# Patient Record
Sex: FEMALE | Race: BLACK OR AFRICAN AMERICAN | NOT HISPANIC OR LATINO | URBAN - METROPOLITAN AREA
[De-identification: names, ages, dates, MRNs, and addresses within clinical notes are randomized per-mention and may not be internally consistent; named-entity substitution may affect disease eponyms.]

---

## 2017-01-09 ENCOUNTER — OUTPATIENT (OUTPATIENT)
Dept: OUTPATIENT SERVICES | Facility: HOSPITAL | Age: 47
LOS: 1 days | End: 2017-01-09
Payer: COMMERCIAL

## 2017-01-09 VITALS
RESPIRATION RATE: 15 BRPM | HEIGHT: 63 IN | WEIGHT: 136.03 LBS | SYSTOLIC BLOOD PRESSURE: 140 MMHG | DIASTOLIC BLOOD PRESSURE: 94 MMHG | HEART RATE: 88 BPM | OXYGEN SATURATION: 99 % | TEMPERATURE: 98 F

## 2017-01-09 DIAGNOSIS — Z98.890 OTHER SPECIFIED POSTPROCEDURAL STATES: Chronic | ICD-10-CM

## 2017-01-09 DIAGNOSIS — R03.0 ELEVATED BLOOD-PRESSURE READING, WITHOUT DIAGNOSIS OF HYPERTENSION: ICD-10-CM

## 2017-01-09 DIAGNOSIS — D25.9 LEIOMYOMA OF UTERUS, UNSPECIFIED: ICD-10-CM

## 2017-01-09 DIAGNOSIS — R05 COUGH: ICD-10-CM

## 2017-01-09 LAB
BASOPHILS # BLD AUTO: 0.02 K/UL — SIGNIFICANT CHANGE UP (ref 0–0.2)
BASOPHILS NFR BLD AUTO: 0.3 % — SIGNIFICANT CHANGE UP (ref 0–2)
BLD GP AB SCN SERPL QL: NEGATIVE — SIGNIFICANT CHANGE UP
BUN SERPL-MCNC: 15 MG/DL — SIGNIFICANT CHANGE UP (ref 7–23)
CALCIUM SERPL-MCNC: 10.2 MG/DL — SIGNIFICANT CHANGE UP (ref 8.4–10.5)
CHLORIDE SERPL-SCNC: 95 MMOL/L — LOW (ref 98–107)
CO2 SERPL-SCNC: 29 MMOL/L — SIGNIFICANT CHANGE UP (ref 22–31)
CREAT SERPL-MCNC: 0.99 MG/DL — SIGNIFICANT CHANGE UP (ref 0.5–1.3)
EOSINOPHIL # BLD AUTO: 0.06 K/UL — SIGNIFICANT CHANGE UP (ref 0–0.5)
EOSINOPHIL NFR BLD AUTO: 1 % — SIGNIFICANT CHANGE UP (ref 0–6)
GLUCOSE SERPL-MCNC: 86 MG/DL — SIGNIFICANT CHANGE UP (ref 70–99)
HCG SERPL-ACNC: < 5 MIU/ML — SIGNIFICANT CHANGE UP
HCT VFR BLD CALC: 41.1 % — SIGNIFICANT CHANGE UP (ref 34.5–45)
HGB BLD-MCNC: 13.1 G/DL — SIGNIFICANT CHANGE UP (ref 11.5–15.5)
IMM GRANULOCYTES NFR BLD AUTO: 0.3 % — SIGNIFICANT CHANGE UP (ref 0–1.5)
LYMPHOCYTES # BLD AUTO: 1.36 K/UL — SIGNIFICANT CHANGE UP (ref 1–3.3)
LYMPHOCYTES # BLD AUTO: 21.6 % — SIGNIFICANT CHANGE UP (ref 13–44)
MCHC RBC-ENTMCNC: 28.9 PG — SIGNIFICANT CHANGE UP (ref 27–34)
MCHC RBC-ENTMCNC: 31.9 % — LOW (ref 32–36)
MCV RBC AUTO: 90.5 FL — SIGNIFICANT CHANGE UP (ref 80–100)
MONOCYTES # BLD AUTO: 0.22 K/UL — SIGNIFICANT CHANGE UP (ref 0–0.9)
MONOCYTES NFR BLD AUTO: 3.5 % — SIGNIFICANT CHANGE UP (ref 2–14)
NEUTROPHILS # BLD AUTO: 4.63 K/UL — SIGNIFICANT CHANGE UP (ref 1.8–7.4)
NEUTROPHILS NFR BLD AUTO: 73.3 % — SIGNIFICANT CHANGE UP (ref 43–77)
PLATELET # BLD AUTO: 337 K/UL — SIGNIFICANT CHANGE UP (ref 150–400)
PMV BLD: 11.4 FL — SIGNIFICANT CHANGE UP (ref 7–13)
POTASSIUM SERPL-MCNC: 5.1 MMOL/L — SIGNIFICANT CHANGE UP (ref 3.5–5.3)
POTASSIUM SERPL-SCNC: 5.1 MMOL/L — SIGNIFICANT CHANGE UP (ref 3.5–5.3)
RBC # BLD: 4.54 M/UL — SIGNIFICANT CHANGE UP (ref 3.8–5.2)
RBC # FLD: 13.2 % — SIGNIFICANT CHANGE UP (ref 10.3–14.5)
RH IG SCN BLD-IMP: POSITIVE — SIGNIFICANT CHANGE UP
SODIUM SERPL-SCNC: 139 MMOL/L — SIGNIFICANT CHANGE UP (ref 135–145)
WBC # BLD: 6.31 K/UL — SIGNIFICANT CHANGE UP (ref 3.8–10.5)
WBC # FLD AUTO: 6.31 K/UL — SIGNIFICANT CHANGE UP (ref 3.8–10.5)

## 2017-01-09 PROCEDURE — 93010 ELECTROCARDIOGRAM REPORT: CPT

## 2017-01-09 RX ORDER — SODIUM CHLORIDE 9 MG/ML
1000 INJECTION, SOLUTION INTRAVENOUS
Qty: 0 | Refills: 0 | Status: DISCONTINUED | OUTPATIENT
Start: 2017-01-19 | End: 2017-01-20

## 2017-01-09 NOTE — H&P PST ADULT - FAMILY HISTORY
Mother  Still living? Yes, Estimated age: Age Unknown  Family history of diabetes mellitus, Age at diagnosis: Age Unknown

## 2017-01-09 NOTE — H&P PST ADULT - PSH
History of dilation and curettage  2015 2016  History of endometrial ablation  2016  History of ovarian cystectomy  2014

## 2017-01-09 NOTE — H&P PST ADULT - PROBLEM SELECTOR PLAN 2
I called PMD office  and informed of /94 at PST.  pt has appt 1/10/17 for eval and clearance, requested on chart.

## 2017-01-09 NOTE — H&P PST ADULT - ASSESSMENT
47 y/o female with hx of heavy menstruation and bleeding in between periods x 2 years. Dx with Leiomyoma of uterus.  Pt reports she has been receiving Lupron injections with some improvement.   Now scheduled for Supracervical hysterectomy with removal of uterus and tubes 1/19/17.

## 2017-01-09 NOTE — H&P PST ADULT - ENMT COMMENTS
s/p URI with sore throat Dec 25,2016.  Completed Tx with Azithromycin and Benzonatate.  Pt reports symptoms resolved, however she still has a hoarse voice. s/p URI with sore throat Dec 25,2016.  Completed Tx with Azithromycin and Benzonatate.  Pt reports she hoarse voice and intermittent dry cough persist. Denies dyspnea, no CP

## 2017-01-09 NOTE — H&P PST ADULT - HISTORY OF PRESENT ILLNESS
47 y/o female with hx of heavy menstruation and bleeding in between periods x 2 years.  Pt reports she has been receiving Lupron injections  with some improvement.  Now scheduled for Supracervical hysterectomy with removal of uterus and tubes 1/19/17. 45 y/o female with hx of heavy menstruation and bleeding in between periods x 2 years. Dx with Leiomyoma of uterus.  Pt reports she has been receiving Lupron injections with some improvement.   Now scheduled for Supracervical hysterectomy with removal of uterus and tubes 1/19/17.

## 2017-01-09 NOTE — H&P PST ADULT - PROBLEM SELECTOR PLAN 3
Pt with hx of URI 12/26/16 , competed course of Z Massimo.  Cough persisting.  Pt has appt with PMD 1/10/17 for re-eval and clearance. Dr. Michi Boykin office informed

## 2017-01-09 NOTE — H&P PST ADULT - GENITOURINARY COMMENTS
47 y/o female with hx of heavy menstruation and bleeding in between periods x 2 years.  Pt reports she has been receiving Lupron injections  with some improvement.  Now scheduled for Supracervical hysterectomy with removal of uterus and tubes 1/19/17.

## 2017-01-17 PROBLEM — Z00.00 ENCOUNTER FOR PREVENTIVE HEALTH EXAMINATION: Status: ACTIVE | Noted: 2017-01-17

## 2017-01-18 ENCOUNTER — RESULT REVIEW (OUTPATIENT)
Age: 47
End: 2017-01-18

## 2017-01-19 ENCOUNTER — INPATIENT (INPATIENT)
Facility: HOSPITAL | Age: 47
LOS: 1 days | Discharge: HOME | End: 2017-01-21
Attending: OBSTETRICS & GYNECOLOGY | Admitting: OBSTETRICS & GYNECOLOGY
Payer: COMMERCIAL

## 2017-01-19 VITALS — WEIGHT: 136.03 LBS | HEIGHT: 63 IN

## 2017-01-19 DIAGNOSIS — Z98.890 OTHER SPECIFIED POSTPROCEDURAL STATES: Chronic | ICD-10-CM

## 2017-01-19 DIAGNOSIS — D25.9 LEIOMYOMA OF UTERUS, UNSPECIFIED: ICD-10-CM

## 2017-01-19 DIAGNOSIS — N92.0 EXCESSIVE AND FREQUENT MENSTRUATION WITH REGULAR CYCLE: ICD-10-CM

## 2017-01-19 LAB
BUN SERPL-MCNC: 7 MG/DL — SIGNIFICANT CHANGE UP (ref 7–23)
CALCIUM SERPL-MCNC: 8.8 MG/DL — SIGNIFICANT CHANGE UP (ref 8.4–10.5)
CHLORIDE SERPL-SCNC: 100 MMOL/L — SIGNIFICANT CHANGE UP (ref 98–107)
CO2 SERPL-SCNC: 24 MMOL/L — SIGNIFICANT CHANGE UP (ref 22–31)
CREAT SERPL-MCNC: 1.01 MG/DL — SIGNIFICANT CHANGE UP (ref 0.5–1.3)
GLUCOSE SERPL-MCNC: 140 MG/DL — HIGH (ref 70–99)
HCG UR QL: NEGATIVE — SIGNIFICANT CHANGE UP
HCT VFR BLD CALC: 34.1 % — LOW (ref 34.5–45)
HGB BLD-MCNC: 11.2 G/DL — LOW (ref 11.5–15.5)
MCHC RBC-ENTMCNC: 29.2 PG — SIGNIFICANT CHANGE UP (ref 27–34)
MCHC RBC-ENTMCNC: 32.8 % — SIGNIFICANT CHANGE UP (ref 32–36)
MCV RBC AUTO: 89 FL — SIGNIFICANT CHANGE UP (ref 80–100)
PLATELET # BLD AUTO: 208 K/UL — SIGNIFICANT CHANGE UP (ref 150–400)
PMV BLD: 11.9 FL — SIGNIFICANT CHANGE UP (ref 7–13)
POTASSIUM SERPL-MCNC: 4.1 MMOL/L — SIGNIFICANT CHANGE UP (ref 3.5–5.3)
POTASSIUM SERPL-SCNC: 4.1 MMOL/L — SIGNIFICANT CHANGE UP (ref 3.5–5.3)
RBC # BLD: 3.83 M/UL — SIGNIFICANT CHANGE UP (ref 3.8–5.2)
RBC # FLD: 13 % — SIGNIFICANT CHANGE UP (ref 10.3–14.5)
RH IG SCN BLD-IMP: POSITIVE — SIGNIFICANT CHANGE UP
SODIUM SERPL-SCNC: 140 MMOL/L — SIGNIFICANT CHANGE UP (ref 135–145)
WBC # BLD: 14.38 K/UL — HIGH (ref 3.8–10.5)
WBC # FLD AUTO: 14.38 K/UL — HIGH (ref 3.8–10.5)

## 2017-01-19 PROCEDURE — 88307 TISSUE EXAM BY PATHOLOGIST: CPT | Mod: 26

## 2017-01-19 PROCEDURE — 88305 TISSUE EXAM BY PATHOLOGIST: CPT | Mod: 26

## 2017-01-19 RX ORDER — DIPHENHYDRAMINE HCL 50 MG
12.5 CAPSULE ORAL EVERY 4 HOURS
Qty: 0 | Refills: 0 | Status: DISCONTINUED | OUTPATIENT
Start: 2017-01-19 | End: 2017-01-20

## 2017-01-19 RX ORDER — ONDANSETRON 8 MG/1
4 TABLET, FILM COATED ORAL ONCE
Qty: 0 | Refills: 0 | Status: DISCONTINUED | OUTPATIENT
Start: 2017-01-19 | End: 2017-01-19

## 2017-01-19 RX ORDER — HYDROMORPHONE HYDROCHLORIDE 2 MG/ML
30 INJECTION INTRAMUSCULAR; INTRAVENOUS; SUBCUTANEOUS
Qty: 0 | Refills: 0 | Status: DISCONTINUED | OUTPATIENT
Start: 2017-01-19 | End: 2017-01-20

## 2017-01-19 RX ORDER — ONDANSETRON 8 MG/1
4 TABLET, FILM COATED ORAL EVERY 6 HOURS
Qty: 0 | Refills: 0 | Status: DISCONTINUED | OUTPATIENT
Start: 2017-01-19 | End: 2017-01-20

## 2017-01-19 RX ORDER — SIMETHICONE 80 MG/1
80 TABLET, CHEWABLE ORAL EVERY 6 HOURS
Qty: 0 | Refills: 0 | Status: DISCONTINUED | OUTPATIENT
Start: 2017-01-19 | End: 2017-01-21

## 2017-01-19 RX ORDER — DEXAMETHASONE 0.5 MG/5ML
4 ELIXIR ORAL EVERY 6 HOURS
Qty: 0 | Refills: 0 | Status: DISCONTINUED | OUTPATIENT
Start: 2017-01-19 | End: 2017-01-20

## 2017-01-19 RX ORDER — HYDROMORPHONE HYDROCHLORIDE 2 MG/ML
0.5 INJECTION INTRAMUSCULAR; INTRAVENOUS; SUBCUTANEOUS
Qty: 0 | Refills: 0 | Status: DISCONTINUED | OUTPATIENT
Start: 2017-01-19 | End: 2017-01-19

## 2017-01-19 RX ORDER — HYDROMORPHONE HYDROCHLORIDE 2 MG/ML
0.5 INJECTION INTRAMUSCULAR; INTRAVENOUS; SUBCUTANEOUS
Qty: 0 | Refills: 0 | Status: DISCONTINUED | OUTPATIENT
Start: 2017-01-19 | End: 2017-01-20

## 2017-01-19 RX ORDER — HEPARIN SODIUM 5000 [USP'U]/ML
5000 INJECTION INTRAVENOUS; SUBCUTANEOUS EVERY 12 HOURS
Qty: 0 | Refills: 0 | Status: DISCONTINUED | OUTPATIENT
Start: 2017-01-19 | End: 2017-01-21

## 2017-01-19 RX ORDER — FENTANYL CITRATE 50 UG/ML
50 INJECTION INTRAVENOUS
Qty: 0 | Refills: 0 | Status: DISCONTINUED | OUTPATIENT
Start: 2017-01-19 | End: 2017-01-19

## 2017-01-19 RX ORDER — DOCUSATE SODIUM 100 MG
100 CAPSULE ORAL THREE TIMES A DAY
Qty: 0 | Refills: 0 | Status: DISCONTINUED | OUTPATIENT
Start: 2017-01-19 | End: 2017-01-21

## 2017-01-19 RX ORDER — BUTORPHANOL TARTRATE 2 MG/ML
0.12 INJECTION, SOLUTION INTRAMUSCULAR; INTRAVENOUS EVERY 6 HOURS
Qty: 0 | Refills: 0 | Status: DISCONTINUED | OUTPATIENT
Start: 2017-01-19 | End: 2017-01-20

## 2017-01-19 RX ORDER — KETOROLAC TROMETHAMINE 30 MG/ML
30 SYRINGE (ML) INJECTION EVERY 6 HOURS
Qty: 0 | Refills: 0 | Status: DISCONTINUED | OUTPATIENT
Start: 2017-01-19 | End: 2017-01-20

## 2017-01-19 RX ORDER — NALOXONE HYDROCHLORIDE 4 MG/.1ML
0.1 SPRAY NASAL
Qty: 0 | Refills: 0 | Status: DISCONTINUED | OUTPATIENT
Start: 2017-01-19 | End: 2017-01-20

## 2017-01-19 RX ORDER — SODIUM CHLORIDE 9 MG/ML
1000 INJECTION, SOLUTION INTRAVENOUS
Qty: 0 | Refills: 0 | Status: DISCONTINUED | OUTPATIENT
Start: 2017-01-19 | End: 2017-01-20

## 2017-01-19 RX ADMIN — HYDROMORPHONE HYDROCHLORIDE 30 MILLILITER(S): 2 INJECTION INTRAMUSCULAR; INTRAVENOUS; SUBCUTANEOUS at 17:39

## 2017-01-19 RX ADMIN — SODIUM CHLORIDE 30 MILLILITER(S): 9 INJECTION, SOLUTION INTRAVENOUS at 17:13

## 2017-01-19 RX ADMIN — SODIUM CHLORIDE 125 MILLILITER(S): 9 INJECTION, SOLUTION INTRAVENOUS at 23:09

## 2017-01-19 RX ADMIN — HYDROMORPHONE HYDROCHLORIDE 0.5 MILLIGRAM(S): 2 INJECTION INTRAMUSCULAR; INTRAVENOUS; SUBCUTANEOUS at 18:47

## 2017-01-19 RX ADMIN — Medication 100 MILLIGRAM(S): at 22:14

## 2017-01-19 RX ADMIN — Medication 30 MILLIGRAM(S): at 19:00

## 2017-01-19 RX ADMIN — HYDROMORPHONE HYDROCHLORIDE 0.5 MILLIGRAM(S): 2 INJECTION INTRAMUSCULAR; INTRAVENOUS; SUBCUTANEOUS at 17:27

## 2017-01-19 RX ADMIN — Medication 30 MILLIGRAM(S): at 18:58

## 2017-01-19 RX ADMIN — HYDROMORPHONE HYDROCHLORIDE 0.5 MILLIGRAM(S): 2 INJECTION INTRAMUSCULAR; INTRAVENOUS; SUBCUTANEOUS at 17:45

## 2017-01-19 RX ADMIN — HEPARIN SODIUM 5000 UNIT(S): 5000 INJECTION INTRAVENOUS; SUBCUTANEOUS at 22:14

## 2017-01-19 RX ADMIN — SODIUM CHLORIDE 125 MILLILITER(S): 9 INJECTION, SOLUTION INTRAVENOUS at 17:27

## 2017-01-19 RX ADMIN — HYDROMORPHONE HYDROCHLORIDE 0.5 MILLIGRAM(S): 2 INJECTION INTRAMUSCULAR; INTRAVENOUS; SUBCUTANEOUS at 17:16

## 2017-01-19 RX ADMIN — HYDROMORPHONE HYDROCHLORIDE 30 MILLILITER(S): 2 INJECTION INTRAMUSCULAR; INTRAVENOUS; SUBCUTANEOUS at 23:09

## 2017-01-20 LAB
HCT VFR BLD CALC: 30.4 % — LOW (ref 34.5–45)
HGB BLD-MCNC: 10 G/DL — LOW (ref 11.5–15.5)
MCHC RBC-ENTMCNC: 29.5 PG — SIGNIFICANT CHANGE UP (ref 27–34)
MCHC RBC-ENTMCNC: 32.9 % — SIGNIFICANT CHANGE UP (ref 32–36)
MCV RBC AUTO: 89.7 FL — SIGNIFICANT CHANGE UP (ref 80–100)
PLATELET # BLD AUTO: 216 K/UL — SIGNIFICANT CHANGE UP (ref 150–400)
PMV BLD: 12.1 FL — SIGNIFICANT CHANGE UP (ref 7–13)
RBC # BLD: 3.39 M/UL — LOW (ref 3.8–5.2)
RBC # FLD: 13 % — SIGNIFICANT CHANGE UP (ref 10.3–14.5)
WBC # BLD: 10.84 K/UL — HIGH (ref 3.8–10.5)
WBC # FLD AUTO: 10.84 K/UL — HIGH (ref 3.8–10.5)

## 2017-01-20 RX ORDER — IBUPROFEN 200 MG
1 TABLET ORAL
Qty: 0 | Refills: 0 | COMMUNITY
Start: 2017-01-20

## 2017-01-20 RX ORDER — OXYCODONE HYDROCHLORIDE 5 MG/1
5 TABLET ORAL EVERY 4 HOURS
Qty: 0 | Refills: 0 | Status: DISCONTINUED | OUTPATIENT
Start: 2017-01-20 | End: 2017-01-21

## 2017-01-20 RX ORDER — ACETAMINOPHEN 500 MG
975 TABLET ORAL EVERY 6 HOURS
Qty: 0 | Refills: 0 | Status: DISCONTINUED | OUTPATIENT
Start: 2017-01-20 | End: 2017-01-21

## 2017-01-20 RX ORDER — OXYCODONE HYDROCHLORIDE 5 MG/1
1 TABLET ORAL
Qty: 20 | Refills: 0 | OUTPATIENT
Start: 2017-01-20

## 2017-01-20 RX ORDER — OXYCODONE HYDROCHLORIDE 5 MG/1
5 TABLET ORAL
Qty: 0 | Refills: 0 | Status: DISCONTINUED | OUTPATIENT
Start: 2017-01-20 | End: 2017-01-21

## 2017-01-20 RX ORDER — ACETAMINOPHEN 500 MG
3 TABLET ORAL
Qty: 0 | Refills: 0 | COMMUNITY
Start: 2017-01-20

## 2017-01-20 RX ORDER — IBUPROFEN 200 MG
600 TABLET ORAL EVERY 6 HOURS
Qty: 0 | Refills: 0 | Status: DISCONTINUED | OUTPATIENT
Start: 2017-01-20 | End: 2017-01-21

## 2017-01-20 RX ADMIN — Medication 100 MILLIGRAM(S): at 05:46

## 2017-01-20 RX ADMIN — Medication 600 MILLIGRAM(S): at 13:52

## 2017-01-20 RX ADMIN — Medication 100 MILLIGRAM(S): at 21:29

## 2017-01-20 RX ADMIN — Medication 975 MILLIGRAM(S): at 17:55

## 2017-01-20 RX ADMIN — Medication 100 MILLIGRAM(S): at 13:16

## 2017-01-20 RX ADMIN — Medication 30 MILLIGRAM(S): at 01:00

## 2017-01-20 RX ADMIN — OXYCODONE HYDROCHLORIDE 5 MILLIGRAM(S): 5 TABLET ORAL at 22:20

## 2017-01-20 RX ADMIN — OXYCODONE HYDROCHLORIDE 5 MILLIGRAM(S): 5 TABLET ORAL at 21:29

## 2017-01-20 RX ADMIN — Medication 975 MILLIGRAM(S): at 13:15

## 2017-01-20 RX ADMIN — Medication 975 MILLIGRAM(S): at 13:51

## 2017-01-20 RX ADMIN — Medication 30 MILLIGRAM(S): at 05:47

## 2017-01-20 RX ADMIN — OXYCODONE HYDROCHLORIDE 5 MILLIGRAM(S): 5 TABLET ORAL at 10:45

## 2017-01-20 RX ADMIN — Medication 600 MILLIGRAM(S): at 17:55

## 2017-01-20 RX ADMIN — Medication 600 MILLIGRAM(S): at 17:35

## 2017-01-20 RX ADMIN — Medication 975 MILLIGRAM(S): at 17:35

## 2017-01-20 RX ADMIN — SIMETHICONE 80 MILLIGRAM(S): 80 TABLET, CHEWABLE ORAL at 22:54

## 2017-01-20 RX ADMIN — Medication 600 MILLIGRAM(S): at 13:15

## 2017-01-20 RX ADMIN — HEPARIN SODIUM 5000 UNIT(S): 5000 INJECTION INTRAVENOUS; SUBCUTANEOUS at 10:45

## 2017-01-20 RX ADMIN — HEPARIN SODIUM 5000 UNIT(S): 5000 INJECTION INTRAVENOUS; SUBCUTANEOUS at 21:29

## 2017-01-20 RX ADMIN — OXYCODONE HYDROCHLORIDE 5 MILLIGRAM(S): 5 TABLET ORAL at 11:15

## 2017-01-20 RX ADMIN — Medication 30 MILLIGRAM(S): at 00:31

## 2017-01-20 RX ADMIN — Medication 30 MILLIGRAM(S): at 06:15

## 2017-01-20 NOTE — DISCHARGE NOTE ADULT - HOSPITAL COURSE
Patient had uncomplicated supracervical hysterectomy with bilateral salpingectomy.  Please see operative note for details.  During postoperative course patient's vitals were stable, vaginal bleeding appropriate, and pain well controlled.  Post operation day one hematocrit was 41.1->34.1->30.4.  On day of discharge patient was ambulating, her pain controlled with oral medications, had adequate oral intake, and was voiding freely.  Discharge instructions and precautions were given.  Will have close follow up with Dr. Haile.

## 2017-01-20 NOTE — DISCHARGE NOTE ADULT - CARE PLAN
Principal Discharge DX:	Uterine leiomyoma, unspecified location  Goal:	recovery  Instructions for follow-up, activity and diet:	Regular diet as tolerated, regular activity as tolerated, no heavy lifting for first two weeks.  Nothing per vagina: no intercourse, tampons or douching.  Call your provider if you experience fevers, chills, worsening abdominal pain, inability to urinate or worsening vaginal bleeding.  Follow up with your provider in 2 weeks.

## 2017-01-20 NOTE — DISCHARGE NOTE ADULT - INSTRUCTIONS
Call MD for fever greater than 101, nausea, vomiting, increased pain, pus drainage from incision, or go to ER.

## 2017-01-20 NOTE — DISCHARGE NOTE ADULT - MEDICATION SUMMARY - MEDICATIONS TO TAKE
I will START or STAY ON the medications listed below when I get home from the hospital:    acetaminophen 325 mg oral tablet  -- 3 tab(s) by mouth every 6 hours  -- Indication: For pain    ibuprofen 600 mg oral tablet  -- 1 tab(s) by mouth every 6 hours  -- Indication: For pain    oxyCODONE 5 mg oral tablet  -- 1 tab(s) by mouth every 6 hours, As Needed, Severe Pain (7 - 10) MDD:4 tabs  -- Indication: For pain

## 2017-01-20 NOTE — DISCHARGE NOTE ADULT - PLAN OF CARE
recovery Regular diet as tolerated, regular activity as tolerated, no heavy lifting for first two weeks.  Nothing per vagina: no intercourse, tampons or douching.  Call your provider if you experience fevers, chills, worsening abdominal pain, inability to urinate or worsening vaginal bleeding.  Follow up with your provider in 2 weeks.

## 2017-01-20 NOTE — DISCHARGE NOTE ADULT - CARE PROVIDER_API CALL
Demetra Haile (MD), Obstetrics and Gynecology  [address]  [place] [id number]  Phone: [phone]  Fax: [phone]

## 2017-01-20 NOTE — DISCHARGE NOTE ADULT - PATIENT PORTAL LINK FT
“You can access the Follow[employer] Patient Portal, offered by [facility], by registering with the following website: [email or web address]”

## 2017-01-21 VITALS
OXYGEN SATURATION: 100 % | HEART RATE: 84 BPM | RESPIRATION RATE: 18 BRPM | SYSTOLIC BLOOD PRESSURE: 140 MMHG | TEMPERATURE: 98 F | DIASTOLIC BLOOD PRESSURE: 79 MMHG

## 2017-01-21 RX ADMIN — OXYCODONE HYDROCHLORIDE 5 MILLIGRAM(S): 5 TABLET ORAL at 02:04

## 2017-01-21 RX ADMIN — Medication 975 MILLIGRAM(S): at 12:00

## 2017-01-21 RX ADMIN — Medication 600 MILLIGRAM(S): at 05:19

## 2017-01-21 RX ADMIN — Medication 100 MILLIGRAM(S): at 05:19

## 2017-01-21 RX ADMIN — Medication 975 MILLIGRAM(S): at 06:10

## 2017-01-21 RX ADMIN — Medication 975 MILLIGRAM(S): at 01:20

## 2017-01-21 RX ADMIN — Medication 600 MILLIGRAM(S): at 06:10

## 2017-01-21 RX ADMIN — Medication 600 MILLIGRAM(S): at 12:00

## 2017-01-21 RX ADMIN — SIMETHICONE 80 MILLIGRAM(S): 80 TABLET, CHEWABLE ORAL at 05:19

## 2017-01-21 RX ADMIN — HEPARIN SODIUM 5000 UNIT(S): 5000 INJECTION INTRAVENOUS; SUBCUTANEOUS at 10:02

## 2017-01-21 RX ADMIN — Medication 600 MILLIGRAM(S): at 00:28

## 2017-01-21 RX ADMIN — Medication 975 MILLIGRAM(S): at 05:19

## 2017-01-21 RX ADMIN — Medication 600 MILLIGRAM(S): at 01:20

## 2017-01-21 RX ADMIN — OXYCODONE HYDROCHLORIDE 5 MILLIGRAM(S): 5 TABLET ORAL at 02:34

## 2017-01-21 RX ADMIN — Medication 975 MILLIGRAM(S): at 00:28

## 2017-01-21 RX ADMIN — Medication 975 MILLIGRAM(S): at 11:29

## 2017-01-21 RX ADMIN — Medication 600 MILLIGRAM(S): at 11:29

## 2017-02-18 ENCOUNTER — INPATIENT (INPATIENT)
Facility: HOSPITAL | Age: 47
LOS: 2 days | Discharge: HOME | End: 2017-02-21
Attending: OBSTETRICS & GYNECOLOGY | Admitting: OBSTETRICS & GYNECOLOGY
Payer: COMMERCIAL

## 2017-02-18 VITALS
RESPIRATION RATE: 22 BRPM | OXYGEN SATURATION: 100 % | SYSTOLIC BLOOD PRESSURE: 132 MMHG | HEART RATE: 88 BPM | DIASTOLIC BLOOD PRESSURE: 91 MMHG | TEMPERATURE: 98 F

## 2017-02-18 DIAGNOSIS — Z90.711 ACQUIRED ABSENCE OF UTERUS WITH REMAINING CERVICAL STUMP: Chronic | ICD-10-CM

## 2017-02-18 DIAGNOSIS — Z98.890 OTHER SPECIFIED POSTPROCEDURAL STATES: Chronic | ICD-10-CM

## 2017-02-18 DIAGNOSIS — R10.9 UNSPECIFIED ABDOMINAL PAIN: ICD-10-CM

## 2017-02-18 PROBLEM — D25.9 LEIOMYOMA OF UTERUS, UNSPECIFIED: Chronic | Status: ACTIVE | Noted: 2017-01-09

## 2017-02-18 LAB
ALBUMIN SERPL ELPH-MCNC: 4.1 G/DL — SIGNIFICANT CHANGE UP (ref 3.3–5)
ALP SERPL-CCNC: 46 U/L — SIGNIFICANT CHANGE UP (ref 40–120)
ALT FLD-CCNC: 14 U/L — SIGNIFICANT CHANGE UP (ref 4–33)
APTT BLD: 32.5 SEC — SIGNIFICANT CHANGE UP (ref 27.5–37.4)
AST SERPL-CCNC: 23 U/L — SIGNIFICANT CHANGE UP (ref 4–32)
BASE EXCESS BLDV CALC-SCNC: 6.9 MMOL/L — SIGNIFICANT CHANGE UP
BASOPHILS # BLD AUTO: 0.02 K/UL — SIGNIFICANT CHANGE UP (ref 0–0.2)
BASOPHILS NFR BLD AUTO: 0.7 % — SIGNIFICANT CHANGE UP (ref 0–2)
BASOPHILS NFR SPEC: 2 % — SIGNIFICANT CHANGE UP (ref 0–2)
BILIRUB SERPL-MCNC: 0.2 MG/DL — SIGNIFICANT CHANGE UP (ref 0.2–1.2)
BLD GP AB SCN SERPL QL: NEGATIVE — SIGNIFICANT CHANGE UP
BUN SERPL-MCNC: 13 MG/DL — SIGNIFICANT CHANGE UP (ref 7–23)
CALCIUM SERPL-MCNC: 9.6 MG/DL — SIGNIFICANT CHANGE UP (ref 8.4–10.5)
CHLORIDE BLDV-SCNC: 106 MMOL/L — SIGNIFICANT CHANGE UP (ref 96–108)
CHLORIDE SERPL-SCNC: 102 MMOL/L — SIGNIFICANT CHANGE UP (ref 98–107)
CO2 SERPL-SCNC: 29 MMOL/L — SIGNIFICANT CHANGE UP (ref 22–31)
CREAT SERPL-MCNC: 0.96 MG/DL — SIGNIFICANT CHANGE UP (ref 0.5–1.3)
EOSINOPHIL # BLD AUTO: 0.06 K/UL — SIGNIFICANT CHANGE UP (ref 0–0.5)
EOSINOPHIL NFR BLD AUTO: 2 % — SIGNIFICANT CHANGE UP (ref 0–6)
EOSINOPHIL NFR FLD: 2 % — SIGNIFICANT CHANGE UP (ref 0–6)
GAS PNL BLDV: 143 MMOL/L — SIGNIFICANT CHANGE UP (ref 136–146)
GLUCOSE BLDV-MCNC: 83 — SIGNIFICANT CHANGE UP (ref 70–99)
GLUCOSE SERPL-MCNC: 82 MG/DL — SIGNIFICANT CHANGE UP (ref 70–99)
HCG SERPL-ACNC: < 5 MIU/ML — SIGNIFICANT CHANGE UP
HCO3 BLDV-SCNC: 29 MMOL/L — HIGH (ref 20–27)
HCT VFR BLD CALC: 36.4 % — SIGNIFICANT CHANGE UP (ref 34.5–45)
HCT VFR BLDV CALC: 37 % — SIGNIFICANT CHANGE UP (ref 34.5–45)
HGB BLD-MCNC: 11.6 G/DL — SIGNIFICANT CHANGE UP (ref 11.5–15.5)
HGB BLDV-MCNC: 12 G/DL — SIGNIFICANT CHANGE UP (ref 11.5–15.5)
IMM GRANULOCYTES NFR BLD AUTO: 0 % — SIGNIFICANT CHANGE UP (ref 0–1.5)
INR BLD: 1.1 — SIGNIFICANT CHANGE UP (ref 0.87–1.18)
LACTATE BLDV-MCNC: 1.9 MMOL/L — SIGNIFICANT CHANGE UP (ref 0.5–2)
LYMPHOCYTES # BLD AUTO: 1.57 K/UL — SIGNIFICANT CHANGE UP (ref 1–3.3)
LYMPHOCYTES # BLD AUTO: 52.7 % — HIGH (ref 13–44)
LYMPHOCYTES NFR SPEC AUTO: 35 % — SIGNIFICANT CHANGE UP (ref 13–44)
MCHC RBC-ENTMCNC: 27.9 PG — SIGNIFICANT CHANGE UP (ref 27–34)
MCHC RBC-ENTMCNC: 31.9 % — LOW (ref 32–36)
MCV RBC AUTO: 87.5 FL — SIGNIFICANT CHANGE UP (ref 80–100)
METAMYELOCYTES # FLD: 1 % — SIGNIFICANT CHANGE UP (ref 0–1)
MONOCYTES # BLD AUTO: 0.16 K/UL — SIGNIFICANT CHANGE UP (ref 0–0.9)
MONOCYTES NFR BLD AUTO: 5.4 % — SIGNIFICANT CHANGE UP (ref 2–14)
MONOCYTES NFR BLD: 7 % — SIGNIFICANT CHANGE UP (ref 2–9)
NEUTROPHIL AB SER-ACNC: 41 % — LOW (ref 43–77)
NEUTROPHILS # BLD AUTO: 1.17 K/UL — LOW (ref 1.8–7.4)
NEUTROPHILS NFR BLD AUTO: 39.2 % — LOW (ref 43–77)
PCO2 BLDV: 51 MMHG — SIGNIFICANT CHANGE UP (ref 41–51)
PH BLDV: 7.41 PH — SIGNIFICANT CHANGE UP (ref 7.32–7.43)
PLATELET # BLD AUTO: 234 K/UL — SIGNIFICANT CHANGE UP (ref 150–400)
PMV BLD: 10.7 FL — SIGNIFICANT CHANGE UP (ref 7–13)
PO2 BLDV: 35 MMHG — SIGNIFICANT CHANGE UP (ref 35–40)
POTASSIUM BLDV-SCNC: 4 MMOL/L — SIGNIFICANT CHANGE UP (ref 3.4–4.5)
POTASSIUM SERPL-MCNC: 4.3 MMOL/L — SIGNIFICANT CHANGE UP (ref 3.5–5.3)
POTASSIUM SERPL-SCNC: 4.3 MMOL/L — SIGNIFICANT CHANGE UP (ref 3.5–5.3)
PROT SERPL-MCNC: 6.8 G/DL — SIGNIFICANT CHANGE UP (ref 6–8.3)
PROTHROM AB SERPL-ACNC: 12.6 SEC — SIGNIFICANT CHANGE UP (ref 10–13.1)
RBC # BLD: 4.16 M/UL — SIGNIFICANT CHANGE UP (ref 3.8–5.2)
RBC # FLD: 13.5 % — SIGNIFICANT CHANGE UP (ref 10.3–14.5)
RH IG SCN BLD-IMP: POSITIVE — SIGNIFICANT CHANGE UP
SAO2 % BLDV: 56.6 % — LOW (ref 60–85)
SODIUM SERPL-SCNC: 146 MMOL/L — HIGH (ref 135–145)
VARIANT LYMPHS # BLD: 12 % — SIGNIFICANT CHANGE UP
WBC # BLD: 2.98 K/UL — LOW (ref 3.8–10.5)
WBC # FLD AUTO: 2.98 K/UL — LOW (ref 3.8–10.5)

## 2017-02-18 PROCEDURE — 74177 CT ABD & PELVIS W/CONTRAST: CPT | Mod: 26

## 2017-02-18 RX ORDER — PIPERACILLIN AND TAZOBACTAM 4; .5 G/20ML; G/20ML
3.38 INJECTION, POWDER, LYOPHILIZED, FOR SOLUTION INTRAVENOUS EVERY 8 HOURS
Qty: 0 | Refills: 0 | Status: DISCONTINUED | OUTPATIENT
Start: 2017-02-18 | End: 2017-02-21

## 2017-02-18 RX ORDER — IBUPROFEN 200 MG
400 TABLET ORAL EVERY 6 HOURS
Qty: 0 | Refills: 0 | Status: DISCONTINUED | OUTPATIENT
Start: 2017-02-18 | End: 2017-02-21

## 2017-02-18 RX ORDER — VANCOMYCIN HCL 1 G
1000 VIAL (EA) INTRAVENOUS EVERY 12 HOURS
Qty: 0 | Refills: 0 | Status: DISCONTINUED | OUTPATIENT
Start: 2017-02-19 | End: 2017-02-21

## 2017-02-18 RX ORDER — DOCUSATE SODIUM 100 MG
100 CAPSULE ORAL THREE TIMES A DAY
Qty: 0 | Refills: 0 | Status: DISCONTINUED | OUTPATIENT
Start: 2017-02-18 | End: 2017-02-21

## 2017-02-18 RX ORDER — PIPERACILLIN AND TAZOBACTAM 4; .5 G/20ML; G/20ML
3.38 INJECTION, POWDER, LYOPHILIZED, FOR SOLUTION INTRAVENOUS ONCE
Qty: 0 | Refills: 0 | Status: COMPLETED | OUTPATIENT
Start: 2017-02-18 | End: 2017-02-18

## 2017-02-18 RX ORDER — SODIUM CHLORIDE 9 MG/ML
1000 INJECTION, SOLUTION INTRAVENOUS
Qty: 0 | Refills: 0 | Status: DISCONTINUED | OUTPATIENT
Start: 2017-02-18 | End: 2017-02-19

## 2017-02-18 RX ORDER — HEPARIN SODIUM 5000 [USP'U]/ML
5000 INJECTION INTRAVENOUS; SUBCUTANEOUS EVERY 12 HOURS
Qty: 0 | Refills: 0 | Status: DISCONTINUED | OUTPATIENT
Start: 2017-02-18 | End: 2017-02-19

## 2017-02-18 RX ORDER — SIMETHICONE 80 MG/1
80 TABLET, CHEWABLE ORAL THREE TIMES A DAY
Qty: 0 | Refills: 0 | Status: DISCONTINUED | OUTPATIENT
Start: 2017-02-18 | End: 2017-02-21

## 2017-02-18 RX ORDER — VANCOMYCIN HCL 1 G
1000 VIAL (EA) INTRAVENOUS ONCE
Qty: 0 | Refills: 0 | Status: COMPLETED | OUTPATIENT
Start: 2017-02-18 | End: 2017-02-18

## 2017-02-18 RX ORDER — ACETAMINOPHEN 500 MG
650 TABLET ORAL ONCE
Qty: 0 | Refills: 0 | Status: COMPLETED | OUTPATIENT
Start: 2017-02-18 | End: 2017-02-18

## 2017-02-18 RX ADMIN — Medication 250 MILLIGRAM(S): at 16:17

## 2017-02-18 RX ADMIN — PIPERACILLIN AND TAZOBACTAM 200 GRAM(S): 4; .5 INJECTION, POWDER, LYOPHILIZED, FOR SOLUTION INTRAVENOUS at 15:29

## 2017-02-18 RX ADMIN — PIPERACILLIN AND TAZOBACTAM 25 GRAM(S): 4; .5 INJECTION, POWDER, LYOPHILIZED, FOR SOLUTION INTRAVENOUS at 22:49

## 2017-02-18 RX ADMIN — Medication 650 MILLIGRAM(S): at 16:17

## 2017-02-18 RX ADMIN — HEPARIN SODIUM 5000 UNIT(S): 5000 INJECTION INTRAVENOUS; SUBCUTANEOUS at 22:04

## 2017-02-18 RX ADMIN — SODIUM CHLORIDE 30 MILLILITER(S): 9 INJECTION, SOLUTION INTRAVENOUS at 17:03

## 2017-02-18 NOTE — ED ADULT TRIAGE NOTE - CHIEF COMPLAINT QUOTE
pt sent by GYN for a r/o abscess, pt states she had a hysterectomy and salpingectomy on 01/19/17, she went for a follow up , also because she has been experiencing  Abdominal pain and Dysuria. pt states she has an in office ultrasound and the doctor found a possible Abscess.   pmhx Fibroids

## 2017-02-18 NOTE — ED PROVIDER NOTE - ATTENDING CONTRIBUTION TO CARE
I performed a face to face evaluation of this patient and performed a history and physical examination on the patient.  I agree with the resident's history, physical examination, and plan of the patient. patient complaining of lower abd pain. sent in from GYN office for possible fluid in abd on US. patient complaining of abd pain since surgery, not improving. denies fever. tender in suprapubic and llq. will perform CT abd/pelvis.

## 2017-02-18 NOTE — H&P ADULT. - PSH
H/O abdominal supracervical subtotal hysterectomy  b/l salpingectomy  History of dilation and curettage  2015 2016  History of endometrial ablation  2016  History of ovarian cystectomy  2014

## 2017-02-18 NOTE — H&P ADULT. - ASSESSMENT
45yo G0 with pelvic abscess in the postoperative state status post HIMA, BS on 1/19/17.    1. Admit to GYN  2. LR@125   3. Zosyn, Vancomycin, Van troughs q4 doses  4. Regular diet  5. Motrin, Tylenol  6. Daily AM CBC/BMP    Brittney, R2 d/w Dr. Kowalski and Maurice R4

## 2017-02-18 NOTE — H&P ADULT. - HISTORY OF PRESENT ILLNESS
45yo G0 p/w CC pelvic abcesses on imaging from her doctors and constant dull abdominal pain since her HIMA, BS on 1/19/17. Patient has had loose stools for 2 days. She denies fevers, chills, nausea, vomiting, cp and sob. Denies dysuria, hematuria frequency. Denies VB and abnormal vaginal discharge.

## 2017-02-18 NOTE — ED PROVIDER NOTE - MEDICAL DECISION MAKING DETAILS
45 yo F with possible post-surgical abscess - CT abdomen and pelvis, labs, urine - will touch base with GYN once labs CT return

## 2017-02-18 NOTE — ED PROVIDER NOTE - OBJECTIVE STATEMENT
47 yo F with history of uterine fibroids s/p hysterectomy on 1/19 with Dr. Haile presenting with persistent pain since the surgery. Pt went to office today 47 yo F with history of uterine fibroids s/p hysterectomy on 1/19 with Dr. Haile presenting with persistent pain since the surgery. Pt went to office today and had US and there was concern for fluid collection with concern for abscess. Pt denies any vaginal bleeding or superficial irritation or collection. Denies fevers, chills, cough, rhinorrhea, otorrhea, otalgia, nausea, vomiting, constipation, diarrhea, chest pain, shortness of breath or changes in urinary habits.

## 2017-02-18 NOTE — ED PROVIDER NOTE - NSTIMEPROVIDERCAREINITIATE_GEN_ER
How Severe Is Your Cyst?: moderate
18-Feb-2017 11:12
Is This A New Presentation, Or A Follow-Up?: Cyst
Additional History: Patient has previously had lesion treated with I&D at an urgent care, but she thinks it may be recurring.

## 2017-02-18 NOTE — ED ADULT NURSE NOTE - OBJECTIVE STATEMENT
Patient received to room 25 awake, alert, oriented x4, able to make needs known verbally.  Patient complains of lower abdominal pain radiating to back and left hip.  Patient s/p hysterectomy one month ago and states she's been having the pain for the whole month now.  Vitals recorded, hemodynamically stable.  Patient ambulatory free from injury.  NO external sings of bleeding or injury.  Patient has increased pain with abdominal palpation.   at bedside for emotional support.  Labs obtained, peripheral IV started.  Call bell within reach, safety maintained, will continue to monitor.

## 2017-02-19 LAB
BASOPHILS # BLD AUTO: 0.01 K/UL — SIGNIFICANT CHANGE UP (ref 0–0.2)
BASOPHILS NFR BLD AUTO: 0.2 % — SIGNIFICANT CHANGE UP (ref 0–2)
EOSINOPHIL # BLD AUTO: 0.1 K/UL — SIGNIFICANT CHANGE UP (ref 0–0.5)
EOSINOPHIL NFR BLD AUTO: 1.7 % — SIGNIFICANT CHANGE UP (ref 0–6)
HCT VFR BLD CALC: 34.9 % — SIGNIFICANT CHANGE UP (ref 34.5–45)
HCT VFR BLD CALC: 34.9 % — SIGNIFICANT CHANGE UP (ref 34.5–45)
HGB BLD-MCNC: 11.3 G/DL — LOW (ref 11.5–15.5)
HGB BLD-MCNC: 11.3 G/DL — LOW (ref 11.5–15.5)
IMM GRANULOCYTES NFR BLD AUTO: 0.2 % — SIGNIFICANT CHANGE UP (ref 0–1.5)
LYMPHOCYTES # BLD AUTO: 1.47 K/UL — SIGNIFICANT CHANGE UP (ref 1–3.3)
LYMPHOCYTES # BLD AUTO: 25.7 % — SIGNIFICANT CHANGE UP (ref 13–44)
MCHC RBC-ENTMCNC: 28 PG — SIGNIFICANT CHANGE UP (ref 27–34)
MCHC RBC-ENTMCNC: 28 PG — SIGNIFICANT CHANGE UP (ref 27–34)
MCHC RBC-ENTMCNC: 32.4 % — SIGNIFICANT CHANGE UP (ref 32–36)
MCHC RBC-ENTMCNC: 32.4 % — SIGNIFICANT CHANGE UP (ref 32–36)
MCV RBC AUTO: 86.6 FL — SIGNIFICANT CHANGE UP (ref 80–100)
MCV RBC AUTO: 86.6 FL — SIGNIFICANT CHANGE UP (ref 80–100)
MONOCYTES # BLD AUTO: 0.32 K/UL — SIGNIFICANT CHANGE UP (ref 0–0.9)
MONOCYTES NFR BLD AUTO: 5.6 % — SIGNIFICANT CHANGE UP (ref 2–14)
NEUTROPHILS # BLD AUTO: 3.81 K/UL — SIGNIFICANT CHANGE UP (ref 1.8–7.4)
NEUTROPHILS NFR BLD AUTO: 66.6 % — SIGNIFICANT CHANGE UP (ref 43–77)
PLATELET # BLD AUTO: 211 K/UL — SIGNIFICANT CHANGE UP (ref 150–400)
PLATELET # BLD AUTO: 211 K/UL — SIGNIFICANT CHANGE UP (ref 150–400)
PMV BLD: 10.7 FL — SIGNIFICANT CHANGE UP (ref 7–13)
PMV BLD: 10.7 FL — SIGNIFICANT CHANGE UP (ref 7–13)
RBC # BLD: 4.03 M/UL — SIGNIFICANT CHANGE UP (ref 3.8–5.2)
RBC # BLD: 4.03 M/UL — SIGNIFICANT CHANGE UP (ref 3.8–5.2)
RBC # FLD: 13.3 % — SIGNIFICANT CHANGE UP (ref 10.3–14.5)
RBC # FLD: 13.3 % — SIGNIFICANT CHANGE UP (ref 10.3–14.5)
WBC # BLD: 5.72 K/UL — SIGNIFICANT CHANGE UP (ref 3.8–10.5)
WBC # BLD: 5.72 K/UL — SIGNIFICANT CHANGE UP (ref 3.8–10.5)
WBC # FLD AUTO: 5.72 K/UL — SIGNIFICANT CHANGE UP (ref 3.8–10.5)
WBC # FLD AUTO: 5.72 K/UL — SIGNIFICANT CHANGE UP (ref 3.8–10.5)

## 2017-02-19 RX ORDER — DIPHENHYDRAMINE HCL 50 MG
25 CAPSULE ORAL ONCE
Qty: 0 | Refills: 0 | Status: DISCONTINUED | OUTPATIENT
Start: 2017-02-19 | End: 2017-02-19

## 2017-02-19 RX ORDER — SODIUM CHLORIDE 9 MG/ML
1000 INJECTION, SOLUTION INTRAVENOUS
Qty: 0 | Refills: 0 | Status: DISCONTINUED | OUTPATIENT
Start: 2017-02-19 | End: 2017-02-20

## 2017-02-19 RX ORDER — DIPHENHYDRAMINE HCL 50 MG
25 CAPSULE ORAL ONCE
Qty: 0 | Refills: 0 | Status: COMPLETED | OUTPATIENT
Start: 2017-02-19 | End: 2017-02-19

## 2017-02-19 RX ADMIN — PIPERACILLIN AND TAZOBACTAM 25 GRAM(S): 4; .5 INJECTION, POWDER, LYOPHILIZED, FOR SOLUTION INTRAVENOUS at 14:52

## 2017-02-19 RX ADMIN — HEPARIN SODIUM 5000 UNIT(S): 5000 INJECTION INTRAVENOUS; SUBCUTANEOUS at 18:10

## 2017-02-19 RX ADMIN — HEPARIN SODIUM 5000 UNIT(S): 5000 INJECTION INTRAVENOUS; SUBCUTANEOUS at 05:35

## 2017-02-19 RX ADMIN — Medication 250 MILLIGRAM(S): at 18:10

## 2017-02-19 RX ADMIN — PIPERACILLIN AND TAZOBACTAM 25 GRAM(S): 4; .5 INJECTION, POWDER, LYOPHILIZED, FOR SOLUTION INTRAVENOUS at 07:13

## 2017-02-19 RX ADMIN — Medication 250 MILLIGRAM(S): at 05:35

## 2017-02-19 RX ADMIN — Medication 25 MILLIGRAM(S): at 10:50

## 2017-02-19 RX ADMIN — PIPERACILLIN AND TAZOBACTAM 25 GRAM(S): 4; .5 INJECTION, POWDER, LYOPHILIZED, FOR SOLUTION INTRAVENOUS at 21:33

## 2017-02-20 LAB
BASOPHILS # BLD AUTO: 0.01 K/UL — SIGNIFICANT CHANGE UP (ref 0–0.2)
BASOPHILS NFR BLD AUTO: 0.2 % — SIGNIFICANT CHANGE UP (ref 0–2)
BUN SERPL-MCNC: 9 MG/DL — SIGNIFICANT CHANGE UP (ref 7–23)
CALCIUM SERPL-MCNC: 8.6 MG/DL — SIGNIFICANT CHANGE UP (ref 8.4–10.5)
CHLORIDE SERPL-SCNC: 101 MMOL/L — SIGNIFICANT CHANGE UP (ref 98–107)
CO2 SERPL-SCNC: 26 MMOL/L — SIGNIFICANT CHANGE UP (ref 22–31)
CREAT SERPL-MCNC: 1.07 MG/DL — SIGNIFICANT CHANGE UP (ref 0.5–1.3)
EOSINOPHIL # BLD AUTO: 0.16 K/UL — SIGNIFICANT CHANGE UP (ref 0–0.5)
EOSINOPHIL NFR BLD AUTO: 4 % — SIGNIFICANT CHANGE UP (ref 0–6)
GLUCOSE SERPL-MCNC: 113 MG/DL — HIGH (ref 70–99)
HCT VFR BLD CALC: 35 % — SIGNIFICANT CHANGE UP (ref 34.5–45)
HGB BLD-MCNC: 11.3 G/DL — LOW (ref 11.5–15.5)
IMM GRANULOCYTES NFR BLD AUTO: 0 % — SIGNIFICANT CHANGE UP (ref 0–1.5)
LYMPHOCYTES # BLD AUTO: 1.34 K/UL — SIGNIFICANT CHANGE UP (ref 1–3.3)
LYMPHOCYTES # BLD AUTO: 33.1 % — SIGNIFICANT CHANGE UP (ref 13–44)
MAGNESIUM SERPL-MCNC: 2 MG/DL — SIGNIFICANT CHANGE UP (ref 1.6–2.6)
MCHC RBC-ENTMCNC: 28.8 PG — SIGNIFICANT CHANGE UP (ref 27–34)
MCHC RBC-ENTMCNC: 32.3 % — SIGNIFICANT CHANGE UP (ref 32–36)
MCV RBC AUTO: 89.1 FL — SIGNIFICANT CHANGE UP (ref 80–100)
MONOCYTES # BLD AUTO: 0.29 K/UL — SIGNIFICANT CHANGE UP (ref 0–0.9)
MONOCYTES NFR BLD AUTO: 7.2 % — SIGNIFICANT CHANGE UP (ref 2–14)
NEUTROPHILS # BLD AUTO: 2.25 K/UL — SIGNIFICANT CHANGE UP (ref 1.8–7.4)
NEUTROPHILS NFR BLD AUTO: 55.5 % — SIGNIFICANT CHANGE UP (ref 43–77)
PHOSPHATE SERPL-MCNC: 3.3 MG/DL — SIGNIFICANT CHANGE UP (ref 2.5–4.5)
PLATELET # BLD AUTO: 209 K/UL — SIGNIFICANT CHANGE UP (ref 150–400)
PMV BLD: 11.2 FL — SIGNIFICANT CHANGE UP (ref 7–13)
POTASSIUM SERPL-MCNC: 4.2 MMOL/L — SIGNIFICANT CHANGE UP (ref 3.5–5.3)
POTASSIUM SERPL-SCNC: 4.2 MMOL/L — SIGNIFICANT CHANGE UP (ref 3.5–5.3)
RBC # BLD: 3.93 M/UL — SIGNIFICANT CHANGE UP (ref 3.8–5.2)
RBC # FLD: 13.3 % — SIGNIFICANT CHANGE UP (ref 10.3–14.5)
SODIUM SERPL-SCNC: 139 MMOL/L — SIGNIFICANT CHANGE UP (ref 135–145)
VANCOMYCIN TROUGH SERPL-MCNC: 16.3 UG/ML — SIGNIFICANT CHANGE UP (ref 10–20)
WBC # BLD: 4.05 K/UL — SIGNIFICANT CHANGE UP (ref 3.8–10.5)
WBC # FLD AUTO: 4.05 K/UL — SIGNIFICANT CHANGE UP (ref 3.8–10.5)

## 2017-02-20 RX ORDER — SODIUM CHLORIDE 9 MG/ML
1000 INJECTION, SOLUTION INTRAVENOUS
Qty: 0 | Refills: 0 | Status: DISCONTINUED | OUTPATIENT
Start: 2017-02-20 | End: 2017-02-21

## 2017-02-20 RX ORDER — HEPARIN SODIUM 5000 [USP'U]/ML
5000 INJECTION INTRAVENOUS; SUBCUTANEOUS EVERY 12 HOURS
Qty: 0 | Refills: 0 | Status: DISCONTINUED | OUTPATIENT
Start: 2017-02-20 | End: 2017-02-20

## 2017-02-20 RX ORDER — METRONIDAZOLE 500 MG
500 TABLET ORAL EVERY 12 HOURS
Qty: 0 | Refills: 0 | Status: DISCONTINUED | OUTPATIENT
Start: 2017-02-20 | End: 2017-02-21

## 2017-02-20 RX ADMIN — Medication 250 MILLIGRAM(S): at 05:27

## 2017-02-20 RX ADMIN — SODIUM CHLORIDE 125 MILLILITER(S): 9 INJECTION, SOLUTION INTRAVENOUS at 09:05

## 2017-02-20 RX ADMIN — SODIUM CHLORIDE 125 MILLILITER(S): 9 INJECTION, SOLUTION INTRAVENOUS at 17:19

## 2017-02-20 RX ADMIN — HEPARIN SODIUM 5000 UNIT(S): 5000 INJECTION INTRAVENOUS; SUBCUTANEOUS at 17:19

## 2017-02-20 RX ADMIN — Medication 250 MILLIGRAM(S): at 18:22

## 2017-02-20 RX ADMIN — PIPERACILLIN AND TAZOBACTAM 25 GRAM(S): 4; .5 INJECTION, POWDER, LYOPHILIZED, FOR SOLUTION INTRAVENOUS at 13:24

## 2017-02-20 RX ADMIN — PIPERACILLIN AND TAZOBACTAM 25 GRAM(S): 4; .5 INJECTION, POWDER, LYOPHILIZED, FOR SOLUTION INTRAVENOUS at 22:42

## 2017-02-20 RX ADMIN — Medication 100 MILLIGRAM(S): at 17:19

## 2017-02-20 RX ADMIN — PIPERACILLIN AND TAZOBACTAM 25 GRAM(S): 4; .5 INJECTION, POWDER, LYOPHILIZED, FOR SOLUTION INTRAVENOUS at 05:27

## 2017-02-21 ENCOUNTER — TRANSCRIPTION ENCOUNTER (OUTPATIENT)
Age: 47
End: 2017-02-21

## 2017-02-21 VITALS
TEMPERATURE: 98 F | OXYGEN SATURATION: 100 % | HEART RATE: 87 BPM | DIASTOLIC BLOOD PRESSURE: 84 MMHG | RESPIRATION RATE: 18 BRPM | SYSTOLIC BLOOD PRESSURE: 133 MMHG

## 2017-02-21 LAB
APTT BLD: 30.1 SEC — SIGNIFICANT CHANGE UP (ref 27.5–37.4)
BASOPHILS # BLD AUTO: 0.01 K/UL — SIGNIFICANT CHANGE UP (ref 0–0.2)
BASOPHILS NFR BLD AUTO: 0.3 % — SIGNIFICANT CHANGE UP (ref 0–2)
BUN SERPL-MCNC: 7 MG/DL — SIGNIFICANT CHANGE UP (ref 7–23)
CALCIUM SERPL-MCNC: 8.5 MG/DL — SIGNIFICANT CHANGE UP (ref 8.4–10.5)
CHLORIDE SERPL-SCNC: 100 MMOL/L — SIGNIFICANT CHANGE UP (ref 98–107)
CO2 SERPL-SCNC: 27 MMOL/L — SIGNIFICANT CHANGE UP (ref 22–31)
CREAT SERPL-MCNC: 0.92 MG/DL — SIGNIFICANT CHANGE UP (ref 0.5–1.3)
EOSINOPHIL # BLD AUTO: 0.11 K/UL — SIGNIFICANT CHANGE UP (ref 0–0.5)
EOSINOPHIL NFR BLD AUTO: 3.1 % — SIGNIFICANT CHANGE UP (ref 0–6)
GLUCOSE SERPL-MCNC: 105 MG/DL — HIGH (ref 70–99)
HCT VFR BLD CALC: 32.8 % — LOW (ref 34.5–45)
HGB BLD-MCNC: 10.4 G/DL — LOW (ref 11.5–15.5)
IMM GRANULOCYTES NFR BLD AUTO: 0.3 % — SIGNIFICANT CHANGE UP (ref 0–1.5)
INR BLD: 0.94 — SIGNIFICANT CHANGE UP (ref 0.87–1.18)
LYMPHOCYTES # BLD AUTO: 1.36 K/UL — SIGNIFICANT CHANGE UP (ref 1–3.3)
LYMPHOCYTES # BLD AUTO: 38.1 % — SIGNIFICANT CHANGE UP (ref 13–44)
MAGNESIUM SERPL-MCNC: 2 MG/DL — SIGNIFICANT CHANGE UP (ref 1.6–2.6)
MCHC RBC-ENTMCNC: 27.9 PG — SIGNIFICANT CHANGE UP (ref 27–34)
MCHC RBC-ENTMCNC: 31.7 % — LOW (ref 32–36)
MCV RBC AUTO: 87.9 FL — SIGNIFICANT CHANGE UP (ref 80–100)
MONOCYTES # BLD AUTO: 0.32 K/UL — SIGNIFICANT CHANGE UP (ref 0–0.9)
MONOCYTES NFR BLD AUTO: 9 % — SIGNIFICANT CHANGE UP (ref 2–14)
NEUTROPHILS # BLD AUTO: 1.76 K/UL — LOW (ref 1.8–7.4)
NEUTROPHILS NFR BLD AUTO: 49.2 % — SIGNIFICANT CHANGE UP (ref 43–77)
PHOSPHATE SERPL-MCNC: 2.3 MG/DL — LOW (ref 2.5–4.5)
PLATELET # BLD AUTO: 191 K/UL — SIGNIFICANT CHANGE UP (ref 150–400)
PMV BLD: 11 FL — SIGNIFICANT CHANGE UP (ref 7–13)
POTASSIUM SERPL-MCNC: 3.9 MMOL/L — SIGNIFICANT CHANGE UP (ref 3.5–5.3)
POTASSIUM SERPL-SCNC: 3.9 MMOL/L — SIGNIFICANT CHANGE UP (ref 3.5–5.3)
PROTHROM AB SERPL-ACNC: 10.7 SEC — SIGNIFICANT CHANGE UP (ref 10–13.1)
RBC # BLD: 3.73 M/UL — LOW (ref 3.8–5.2)
RBC # FLD: 13.2 % — SIGNIFICANT CHANGE UP (ref 10.3–14.5)
SODIUM SERPL-SCNC: 139 MMOL/L — SIGNIFICANT CHANGE UP (ref 135–145)
WBC # BLD: 3.57 K/UL — LOW (ref 3.8–10.5)
WBC # FLD AUTO: 3.57 K/UL — LOW (ref 3.8–10.5)

## 2017-02-21 PROCEDURE — 76830 TRANSVAGINAL US NON-OB: CPT | Mod: 26

## 2017-02-21 RX ORDER — CEPHALEXIN 500 MG
1 CAPSULE ORAL
Qty: 40 | Refills: 0 | OUTPATIENT
Start: 2017-02-21 | End: 2017-03-03

## 2017-02-21 RX ORDER — METRONIDAZOLE 500 MG
500 TABLET ORAL EVERY 12 HOURS
Qty: 0 | Refills: 0 | Status: DISCONTINUED | OUTPATIENT
Start: 2017-02-21 | End: 2017-02-21

## 2017-02-21 RX ORDER — CEPHALEXIN 500 MG
500 CAPSULE ORAL
Qty: 0 | Refills: 0 | Status: DISCONTINUED | OUTPATIENT
Start: 2017-02-21 | End: 2017-02-21

## 2017-02-21 RX ORDER — FLUCONAZOLE 150 MG/1
1 TABLET ORAL
Qty: 1 | Refills: 0 | OUTPATIENT
Start: 2017-02-21

## 2017-02-21 RX ORDER — METRONIDAZOLE 500 MG
1 TABLET ORAL
Qty: 28 | Refills: 0 | OUTPATIENT
Start: 2017-02-21 | End: 2017-03-07

## 2017-02-21 RX ORDER — METRONIDAZOLE 500 MG
500 TABLET ORAL EVERY 8 HOURS
Qty: 0 | Refills: 0 | Status: DISCONTINUED | OUTPATIENT
Start: 2017-02-21 | End: 2017-02-21

## 2017-02-21 RX ADMIN — Medication 100 MILLIGRAM(S): at 06:04

## 2017-02-21 RX ADMIN — SODIUM CHLORIDE 125 MILLILITER(S): 9 INJECTION, SOLUTION INTRAVENOUS at 02:52

## 2017-02-21 RX ADMIN — PIPERACILLIN AND TAZOBACTAM 25 GRAM(S): 4; .5 INJECTION, POWDER, LYOPHILIZED, FOR SOLUTION INTRAVENOUS at 06:05

## 2017-02-21 RX ADMIN — SODIUM CHLORIDE 125 MILLILITER(S): 9 INJECTION, SOLUTION INTRAVENOUS at 06:13

## 2017-02-21 RX ADMIN — Medication 250 MILLIGRAM(S): at 06:12

## 2017-02-21 NOTE — DISCHARGE NOTE ADULT - PATIENT PORTAL LINK FT
“You can access the Follow[employer] Patient Portal, offered by [facility], by registering with the following website: [email or web address]”

## 2017-02-21 NOTE — DISCHARGE NOTE ADULT - PLAN OF CARE
return to normal activities Call your doctor to go to Emergency Department if you develop fevers, chills, Shortness of breath, chest pain, vaginal bleeding, uncontrolled pain. Follow-up w/ Dr. Borden in [facility] on Friday. Call to confirm your appointment. Take the diflucan only if you develop a yeast infection secondary to the other antibiotics that were ordered for me.

## 2017-02-21 NOTE — DISCHARGE NOTE ADULT - CARE PROVIDER_API CALL
Demetra Haile (MD), Obstetrics and Gynecology  [address]  [place] [id number]  Phone: [phone]  Fax: [phone]

## 2017-02-21 NOTE — DISCHARGE NOTE ADULT - MEDICATION SUMMARY - MEDICATIONS TO TAKE
I will START or STAY ON the medications listed below when I get home from the hospital:    Flagyl 500 mg oral tablet  -- 1 tab(s) by mouth 2 times a day MDD:2  -- Do not drink alcoholic beverages when taking this medication.  Finish all this medication unless otherwise directed by prescriber.  May discolor urine or feces.    -- Indication: For pelvic pain    Diflucan 150 mg oral tablet  -- 1 tab(s) by mouth once if you develop yeast infection  -- Do not take this drug if you are pregnant.  Finish all this medication unless otherwise directed by prescriber.    -- Indication: For pelvic pain    Keflex 500 mg oral capsule  -- 1 cap(s) by mouth 4 times a day  -- Finish all this medication unless otherwise directed by prescriber.    -- Indication: For pelvic pain

## 2017-02-21 NOTE — DISCHARGE NOTE ADULT - CARE PLAN
Principal Discharge DX:	Abdominal pain, unspecified location  Goal:	return to normal activities  Instructions for follow-up, activity and diet:	Call your doctor to go to Emergency Department if you develop fevers, chills, Shortness of breath, chest pain, vaginal bleeding, uncontrolled pain. Follow-up w/ Dr. Borden in [facility] on Friday. Call to confirm your appointment. Take the diflucan only if you develop a yeast infection secondary to the other antibiotics that were ordered for me. Principal Discharge DX:	Abdominal pain, unspecified location  Goal:	return to normal activities  Instructions for follow-up, activity and diet:	Call your doctor to go to Emergency Department if you develop fevers, chills, Shortness of breath, chest pain, vaginal bleeding, uncontrolled pain. Follow-up w/ Dr. Borden in [facility] on Friday. Call to confirm your appointment. Take the diflucan only if you develop a yeast infection secondary to the other antibiotics that were ordered for me. Principal Discharge DX:	Abdominal pain, unspecified location  Goal:	return to normal activities  Instructions for follow-up, activity and diet:	Call your doctor to go to Emergency Department if you develop fevers, chills, Shortness of breath, chest pain, vaginal bleeding, uncontrolled pain. Follow-up w/ Dr. Borden in [facility] on Friday. Call to confirm your appointment. Take the diflucan only if you develop a yeast infection secondary to the other antibiotics that were ordered for me. Principal Discharge DX:	Abdominal pain, unspecified location  Goal:	return to normal activities  Instructions for follow-up, activity and diet:	Call your doctor to go to Emergency Department if you develop fevers, chills, Shortness of breath, chest pain, vaginal bleeding, uncontrolled pain. Follow-up w/ Dr. Borden in [facility] on Friday. Call to confirm your appointment. Take the diflucan only if you develop a yeast infection secondary to the other antibiotics that were ordered for me. Principal Discharge DX:	Abdominal pain, unspecified location  Goal:	return to normal activities  Instructions for follow-up, activity and diet:	Call your doctor to go to Emergency Department if you develop fevers, chills, Shortness of breath, chest pain, vaginal bleeding, uncontrolled pain. Follow-up w/ Dr. Borden in [facility] on Friday. Call to confirm your appointment. Take the diflucan only if you develop a yeast infection secondary to the other antibiotics that were ordered for me.

## 2017-02-21 NOTE — DISCHARGE NOTE ADULT - HOSPITAL COURSE
45yo a/w pelvic pain s/p HIMA, BS for fibroids (1/19) likely 2/2 to hematoma. Patient was found to have collection in the rectovesicular space. Patient was on vancomycin, zosyn, flagyn during hospital stay as the original diagnosis was thought to be an abscess. Patient discharged on HD#4 with close follow-up with Dr. Haile. 47yo a/w pelvic pain on 2/18 s/p HIMA, BS for fibroids (1/19). Patient was found on CTAP to have a possible abscess in the rectovesicular space. Patient was started on IV vancomycin, zosyn, flagyl during hospital stay. TVUS on 2/21 demonstrated a collection that was more consistent with a hematoma. IR was consulted and it was determined that collection did not need to be drained given improving clinical status. Patient discharged on HD#4 with close follow-up with Dr. Haile.

## 2017-08-14 NOTE — ED ADULT TRIAGE NOTE - CCCP TRG CHIEF CMPLNT
[facility]  Preoperative Instructions        Surgery Date 8/21/2017          Time of Arrival 12:30 PM    1. On the day of your surgery, please report to the Surgical Services Registration Desk and sign in at your designated time. The Surgery Center is located to the right of the Emergency Room. 2. You must have someone with you to drive you home. You should not drive a car for 24 hours following surgery. Please make arrangements for a friend or family member to stay with you for the first 24 hours after your surgery. 3. Do not have anything to eat or drink (including water, gum, mints, coffee, juice) after midnight ?8/20/2017? Frutoso Golder ? This may not apply to medications prescribed by your physician. ?(Please note below the special instructions with medications to take the morning of your procedure.)    4. We recommend you do not drink any alcoholic beverages for 24 hours before and after your surgery. 5. Stop all Aspirin, non-steroidal anti-inflammatory drugs (i.e. Advil, Aleve), vitamins, and supplements?as directed by your surgeon's office. ? **If you are currently taking Plavix, Coumadin, or other blood-thinning agents, contact your surgeon for instructions. **    6. Wear comfortable clothes. Wear glasses instead of contacts. Do not bring any money or jewelry. Please bring picture ID, insurance card, and any prearranged co-payment or hospital payment. Do not wear make-up, particularly mascara the morning of your surgery. Do not wear nail polish, particularly if you are having foot /hand surgery. Wear your hair loose or down, no ponytails, buns, cris pins or clips. All body piercings must be removed. Please shower with antibacterial soap for three consecutive days before and on the morning of surgery, but do not apply any lotions, powders or deodorants after the shower on the day of surgery. Please use a fresh towels after each shower.  Please sleep in clean clothes and change bed linens the night before surgery. Please do not shave for 48 hours prior to surgery. Shaving of the face is acceptable. 7. You should understand that if you do not follow these instructions your surgery may be cancelled. If your physical condition changes (I.e. fever, cold or flu) please contact your surgeon as soon as possible. 8. It is important that you be on time. If a situation occurs where you may be late, please call [phone] (OR Holding Area). 9. If you have any questions and or problems, please call [phone] (Pre-admission Testing). 10. Your surgery time may be subject to change. You will receive a phone call the evening prior if your time changes. 11.  If having outpatient surgery, you must have someone to drive you here, stay with you during the duration of your stay, and to drive you home at time of discharge. Special Instructions:None  MEDICATIONS TO TAKE THE MORNING OF SURGERY WITH A SIP OF WATER:None      I understand a pre-operative phone call will be made to verify my surgery time. In the event that I am not available, I give permission for a message to be left on my answering service and/or with another person?   Yes [phone]         ___________________      __________   _________    (Signature of Patient)             (Witness)                (Date and Time) abscess

## 2017-09-12 ENCOUNTER — EMERGENCY (EMERGENCY)
Facility: HOSPITAL | Age: 47
LOS: 1 days | Discharge: HOME | End: 2017-09-12
Attending: EMERGENCY MEDICINE | Admitting: EMERGENCY MEDICINE
Payer: COMMERCIAL

## 2017-09-12 VITALS
OXYGEN SATURATION: 100 % | SYSTOLIC BLOOD PRESSURE: 154 MMHG | DIASTOLIC BLOOD PRESSURE: 92 MMHG | RESPIRATION RATE: 16 BRPM | HEART RATE: 105 BPM | TEMPERATURE: 98 F

## 2017-09-12 VITALS
SYSTOLIC BLOOD PRESSURE: 152 MMHG | DIASTOLIC BLOOD PRESSURE: 105 MMHG | RESPIRATION RATE: 18 BRPM | HEART RATE: 122 BPM | OXYGEN SATURATION: 100 % | TEMPERATURE: 98 F

## 2017-09-12 DIAGNOSIS — R10.9 UNSPECIFIED ABDOMINAL PAIN: ICD-10-CM

## 2017-09-12 DIAGNOSIS — Z98.890 OTHER SPECIFIED POSTPROCEDURAL STATES: Chronic | ICD-10-CM

## 2017-09-12 DIAGNOSIS — Z90.711 ACQUIRED ABSENCE OF UTERUS WITH REMAINING CERVICAL STUMP: Chronic | ICD-10-CM

## 2017-09-12 LAB
ALBUMIN SERPL ELPH-MCNC: 4.4 G/DL — SIGNIFICANT CHANGE UP (ref 3.3–5)
ALP SERPL-CCNC: 54 U/L — SIGNIFICANT CHANGE UP (ref 40–120)
ALT FLD-CCNC: 26 U/L — SIGNIFICANT CHANGE UP (ref 4–33)
APPEARANCE UR: CLEAR — SIGNIFICANT CHANGE UP
AST SERPL-CCNC: 52 U/L — HIGH (ref 4–32)
BACTERIA # UR AUTO: SIGNIFICANT CHANGE UP
BASOPHILS # BLD AUTO: 0.01 K/UL — SIGNIFICANT CHANGE UP (ref 0–0.2)
BASOPHILS NFR BLD AUTO: 0.4 % — SIGNIFICANT CHANGE UP (ref 0–2)
BILIRUB SERPL-MCNC: 0.4 MG/DL — SIGNIFICANT CHANGE UP (ref 0.2–1.2)
BILIRUB UR-MCNC: NEGATIVE — SIGNIFICANT CHANGE UP
BLOOD UR QL VISUAL: HIGH
BUN SERPL-MCNC: 8 MG/DL — SIGNIFICANT CHANGE UP (ref 7–23)
CALCIUM SERPL-MCNC: 9.4 MG/DL — SIGNIFICANT CHANGE UP (ref 8.4–10.5)
CHLORIDE SERPL-SCNC: 102 MMOL/L — SIGNIFICANT CHANGE UP (ref 98–107)
CO2 SERPL-SCNC: 23 MMOL/L — SIGNIFICANT CHANGE UP (ref 22–31)
COLOR SPEC: YELLOW — SIGNIFICANT CHANGE UP
CREAT SERPL-MCNC: 1.04 MG/DL — SIGNIFICANT CHANGE UP (ref 0.5–1.3)
EOSINOPHIL # BLD AUTO: 0.04 K/UL — SIGNIFICANT CHANGE UP (ref 0–0.5)
EOSINOPHIL NFR BLD AUTO: 1.4 % — SIGNIFICANT CHANGE UP (ref 0–6)
GLUCOSE SERPL-MCNC: 73 MG/DL — SIGNIFICANT CHANGE UP (ref 70–99)
GLUCOSE UR-MCNC: NEGATIVE — SIGNIFICANT CHANGE UP
HCT VFR BLD CALC: 36 % — SIGNIFICANT CHANGE UP (ref 34.5–45)
HGB BLD-MCNC: 12.2 G/DL — SIGNIFICANT CHANGE UP (ref 11.5–15.5)
IMM GRANULOCYTES # BLD AUTO: 0.01 # — SIGNIFICANT CHANGE UP
IMM GRANULOCYTES NFR BLD AUTO: 0.4 % — SIGNIFICANT CHANGE UP (ref 0–1.5)
KETONES UR-MCNC: SIGNIFICANT CHANGE UP
LEUKOCYTE ESTERASE UR-ACNC: NEGATIVE — SIGNIFICANT CHANGE UP
LYMPHOCYTES # BLD AUTO: 1.3 K/UL — SIGNIFICANT CHANGE UP (ref 1–3.3)
LYMPHOCYTES # BLD AUTO: 46.3 % — HIGH (ref 13–44)
MCHC RBC-ENTMCNC: 30.9 PG — SIGNIFICANT CHANGE UP (ref 27–34)
MCHC RBC-ENTMCNC: 33.9 % — SIGNIFICANT CHANGE UP (ref 32–36)
MCV RBC AUTO: 91.1 FL — SIGNIFICANT CHANGE UP (ref 80–100)
MONOCYTES # BLD AUTO: 0.24 K/UL — SIGNIFICANT CHANGE UP (ref 0–0.9)
MONOCYTES NFR BLD AUTO: 8.5 % — SIGNIFICANT CHANGE UP (ref 2–14)
MUCOUS THREADS # UR AUTO: SIGNIFICANT CHANGE UP
NEUTROPHILS # BLD AUTO: 1.21 K/UL — LOW (ref 1.8–7.4)
NEUTROPHILS NFR BLD AUTO: 43 % — SIGNIFICANT CHANGE UP (ref 43–77)
NITRITE UR-MCNC: NEGATIVE — SIGNIFICANT CHANGE UP
NRBC # FLD: 0 — SIGNIFICANT CHANGE UP
PH UR: 8 — SIGNIFICANT CHANGE UP (ref 4.6–8)
PLATELET # BLD AUTO: 172 K/UL — SIGNIFICANT CHANGE UP (ref 150–400)
PMV BLD: 11 FL — SIGNIFICANT CHANGE UP (ref 7–13)
POTASSIUM SERPL-MCNC: 5.8 MMOL/L — HIGH (ref 3.5–5.3)
POTASSIUM SERPL-SCNC: 5.8 MMOL/L — HIGH (ref 3.5–5.3)
PROT SERPL-MCNC: 7.3 G/DL — SIGNIFICANT CHANGE UP (ref 6–8.3)
PROT UR-MCNC: 20 — SIGNIFICANT CHANGE UP
RBC # BLD: 3.95 M/UL — SIGNIFICANT CHANGE UP (ref 3.8–5.2)
RBC # FLD: 13.3 % — SIGNIFICANT CHANGE UP (ref 10.3–14.5)
RBC CASTS # UR COMP ASSIST: SIGNIFICANT CHANGE UP (ref 0–?)
SODIUM SERPL-SCNC: 142 MMOL/L — SIGNIFICANT CHANGE UP (ref 135–145)
SP GR SPEC: 1.02 — SIGNIFICANT CHANGE UP (ref 1–1.03)
SQUAMOUS # UR AUTO: SIGNIFICANT CHANGE UP
UROBILINOGEN FLD QL: NORMAL E.U. — SIGNIFICANT CHANGE UP (ref 0.1–0.2)
WBC # BLD: 2.81 K/UL — LOW (ref 3.8–10.5)
WBC # FLD AUTO: 2.81 K/UL — LOW (ref 3.8–10.5)
WBC UR QL: SIGNIFICANT CHANGE UP (ref 0–?)

## 2017-09-12 PROCEDURE — 76830 TRANSVAGINAL US NON-OB: CPT | Mod: 26

## 2017-09-12 PROCEDURE — 99285 EMERGENCY DEPT VISIT HI MDM: CPT

## 2017-09-12 PROCEDURE — 74177 CT ABD & PELVIS W/CONTRAST: CPT | Mod: 26

## 2017-09-12 RX ORDER — SODIUM CHLORIDE 9 MG/ML
1000 INJECTION INTRAMUSCULAR; INTRAVENOUS; SUBCUTANEOUS ONCE
Qty: 0 | Refills: 0 | Status: COMPLETED | OUTPATIENT
Start: 2017-09-12 | End: 2017-09-12

## 2017-09-12 RX ORDER — MORPHINE SULFATE 50 MG/1
4 CAPSULE, EXTENDED RELEASE ORAL ONCE
Qty: 0 | Refills: 0 | Status: DISCONTINUED | OUTPATIENT
Start: 2017-09-12 | End: 2017-09-12

## 2017-09-12 RX ADMIN — SODIUM CHLORIDE 1000 MILLILITER(S): 9 INJECTION INTRAMUSCULAR; INTRAVENOUS; SUBCUTANEOUS at 18:31

## 2017-09-12 RX ADMIN — MORPHINE SULFATE 4 MILLIGRAM(S): 50 CAPSULE, EXTENDED RELEASE ORAL at 12:48

## 2017-09-12 RX ADMIN — SODIUM CHLORIDE 1000 MILLILITER(S): 9 INJECTION INTRAMUSCULAR; INTRAVENOUS; SUBCUTANEOUS at 11:03

## 2017-09-12 RX ADMIN — MORPHINE SULFATE 4 MILLIGRAM(S): 50 CAPSULE, EXTENDED RELEASE ORAL at 11:09

## 2017-09-12 NOTE — ED SUB INTERN NOTE - NOTES
updated on patient condition and past GYN hx regarding being previously told to have left sided ovarian cyst surgery; will see patient in ED

## 2017-09-12 NOTE — ED PROVIDER NOTE - CARE PLAN
Principal Discharge DX:	Abdominal pain  Instructions for follow-up, activity and diet:	Rest, stay hydrated, take all meds as previously prescribed, take motrin 600mg every 6-8 hours as needed with food for pain - may alternate with over te counter tylenol. Followup with your OBYGYN within 2 days for post hospital visit. Show your doctor and specialists all copies of labs given to you. Return for worsening symptoms, ex. fever, worsening abdominal pain, intractable vomiting, etc. Please read all the patient handouts.

## 2017-09-12 NOTE — CONSULT NOTE ADULT - SUBJECTIVE AND OBJECTIVE BOX
HPI: 47y G0 presents with L sided abdominal pain x 1 month duration that has been increasing in intensity. Pt took Motrin this morning that did not relieve her pain. Pt denies any SOB, CP, n/v, fever/chills. Pt denies any abnormal vaginal discharge or bleeding. Pt has regular follow up with Dr. Borden, no abnormal PAPs. Pt s/p HIMA on 2017 with post-op course c/b abscess w/re-admit.     OB/GYN HISTORY:     Name of GYN Physician: Demetra Borden     OBGYN: +fibroids, +cysts, no abn PAP, no endometriosis  PMH: denies  PSH: LSC cystectomy, HIMA  Meds: denies  Allergies: denies  Social: denies x 3  FHx: denies    ROS wnl, except as per HPI    Vital Signs Last 24 Hrs  T(C): 36.6 (12 Sep 2017 13:53), Max: 36.6 (12 Sep 2017 13:53)  T(F): 97.9 (12 Sep 2017 13:53), Max: 97.9 (12 Sep 2017 13:53)  HR: 103 (12 Sep 2017 13:53) (103 - 122)  BP: 163/95 (12 Sep 2017 13:53) (152/105 - 163/95)  BP(mean): --  RR: 19 (12 Sep 2017 13:53) (18 - 19)  SpO2: 100% (12 Sep 2017 13:53) (100% - 100%)    Physical Exam:  Gen:AAOx3, NAD  CV: RRR  Pulm:CTAB/L  Abd: soft, NT, ND  Gyn: cervix appears wnl, no CMT, +right adnexal tenderness, no left adnexal tenderness  Ext: NTB/L    LABS:                        12.2   2.81  )-----------( 172      ( 12 Sep 2017 12:26 )             36.0     09-12    142  |  102  |  8   ----------------------------<  73  5.8<H>   |  23  |  1.04    Ca    9.4      12 Sep 2017 10:57    TPro  7.3  /  Alb  4.4  /  TBili  0.4  /  DBili  x   /  AST  52<H>  /  ALT  26  /  AlkPhos  54  09-12      Urinalysis Basic - ( 12 Sep 2017 10:57 )    Color: YELLOW / Appearance: CLEAR / S.020 / pH: 8.0  Gluc: NEGATIVE / Ketone: TRACE  / Bili: NEGATIVE / Urobili: NORMAL E.U.   Blood: TRACE / Protein: 20 / Nitrite: NEGATIVE   Leuk Esterase: NEGATIVE / RBC: 10-25 / WBC 0-2   Sq Epi: MOD / Non Sq Epi: x / Bacteria: FEW    RADIOLOGY & ADDITIONAL STUDIES:  TVUS ():    FINDINGS:    Uterus: Status post hysterectomy. Avascular tissue in the region of the   resection bed/right adnexa, measuring 1.4 x 1.0 x 1.3 cm, possibly   sequela of previously seen postoperative collection.    Right ovary: 2.5 x 1.9 cm. Cyst, measuring 1.7 x 1.3 cm.    Left ovary: 3.3 x 1.7 x 3.1 cm. Cyst, measuring 1.8 x 1.5 x 1.7 cm.    Fluid: None.    IMPRESSION:    1.  Bilateral ovarian cysts.  2.  Status post hysterectomywith avascular tissue in the region of the   resection bed/right adnexa, possibly sequela of previously seen   postoperative collection.    CTAP ():  FINDINGS:    LOWER CHEST: Within normal limits.    LIVER: Borderline enlarged. Small subcentimeter hypodense lesions are   likely cysts.  BILE DUCTS: Normal caliber.  GALLBLADDER: Within normal limits.  SPLEEN: Within normal limits.  PANCREAS: Within normal limits.  ADRENALS: Within normal limits.  KIDNEYS/URETERS: Within normal limits.    BLADDER: Within normal limits.  REPRODUCTIVE ORGANS: Status post hysterectomy. No adnexal mass.    BOWEL: No bowel obstruction. Appendix is normal.  PERITONEUM: No ascites.  VESSELS:  Within normal limits.  RETROPERITONEUM: No lymphadenopathy.    ABDOMINAL WALL: Tiny fat-containing umbilical hernia.  BONES: Within normal limits.    IMPRESSION:     A cause for the patient's left lower quadrant pain is not elucidated on   this examination.

## 2017-09-12 NOTE — ED SUB INTERN NOTE - MEDICAL DECISION MAKING DETAILS
patient will have pain mgt with morphine; explained need for pelvic exam and TVUS + CT a/p to r/o ovarian vs intraabdominal etiology of pain, will order routine labs and IVF, will reassess q30 min with VS

## 2017-09-12 NOTE — ED PROVIDER NOTE - PLAN OF CARE
Rest, stay hydrated, take all meds as previously prescribed, take motrin 600mg every 6-8 hours as needed with food for pain - may alternate with over te counter tylenol. Followup with your OBYGYN within 2 days for post hospital visit. Show your doctor and specialists all copies of labs given to you. Return for worsening symptoms, ex. fever, worsening abdominal pain, intractable vomiting, etc. Please read all the patient handouts.

## 2017-09-12 NOTE — ED ADULT NURSE REASSESSMENT NOTE - NS ED NURSE REASSESS COMMENT FT1
pt with Hx. partial hysterectomy due to fibroids, in January coming with left side lower ABD pain started few hrs ago took Motrin w/o relief.   denies vag. bleeding,no N/V at present time.   IV to right lat. AC 18g angio cath in placed, PO gastro v. started for Ct of ABD.    Neela Joyner RN

## 2017-09-12 NOTE — ED PROVIDER NOTE - SHIFT CHANGE DETAILS
re-eval for HR reduction after IVF; pt declines repeat pain control in ED; if HR reduced after IVF pt can be discharged with copies of labs/imaging results to follow up

## 2017-09-12 NOTE — ED PROVIDER NOTE - PROGRESS NOTE DETAILS
ED Attending Dr. Rajan: HR 80s after pain control and IVF; pt notes feeling improved ED Attending Dr. Rajan: HR again 100 and pt with mild left pelvic/LLQ pain on palpation still; unclear etiology of pt's pain based on labs and imaging; WBC 2.81 noted, pt has had low WBCs in the past, unclear etiology ED Attending Dr. Rajan: HR again 100 and pt with mild left pelvic/LLQ pain on palpation still; unclear etiology of pt's pain based on labs and imaging; WBC 2.81 noted, pt has had low WBCs in the past, unclear etiology; pt declines repeat pain control at this time ED Attending Dr. Rajan: spoke to OBGYN--no acute intervention from OBGYN, low suspicion for torsion SARITA HARRIS - pt reassessed, discussed lab and imaging results with pt, pt states she would like to be d/c'd, pt stable and can f/u with her gyn.

## 2017-09-12 NOTE — ED SUB INTERN NOTE - PHYSICAL EXAMINATION
General: well-appearing, answering questions appropriately, NAD  HEENT: AT/NC, PERRL/EOMI, normal conjunctiva  CV: peripheral pulses 2+ bilaterally, no MRG, tachycardia, S1, S2, no extra heart sounds, capillary refill <2 seconds  Pulmonary/Chest: nontender anterior/posterior chest, CTAB, no wheezes/rhonchi/crackles  Abdomen/GI: soft, tenderness to LLQ and LUQ, nondistended, no scars, no rashes, no masses, no organomegaly  : left CVA tenderness, no vaginal discharge, cervical os closed, left CMT, female chaperone present for pelvic exam  MSK: FROM to BUE and BLE, 5/5 motor strength to all extremities  Neuro: AOx3, sensation intact & symmetric bilaterally  Psych: normal mood and affect

## 2017-09-12 NOTE — ED SUB INTERN NOTE - OBJECTIVE STATEMENT FT
46 yo F with PMHx left ovarian cyst and partial hysterectomy for uterine fibroids on 01/2017 presents to ED with CC left pelvic pain. She reports 1 month of constant throbbing pain which gradually has worsened, with worst pain today 10/10. She denies aggravating factors and reports no improvement with 800 mg ibuprofen. She denies fever, chills, dysuria, N/V/D, constipation, or vaginal discharge/bleeding. She takes no other medications and denies other medical hx.

## 2017-09-12 NOTE — CONSULT NOTE ADULT - PROBLEM SELECTOR RECOMMENDATION 9
-unlikely due to GYN origin  -follow up with outpt obgyn for routine care    d/w Dr. Wallace Taylor, pgy2

## 2017-09-12 NOTE — ED PROVIDER NOTE - MEDICAL DECISION MAKING DETAILS
48 yo female with 1+ month of left pelvic pain, on exam with left adnexal TTP and LLQ pain; known to have ?ovarian cyst recently; favors  cause of symptoms but also there is TTP to LLQ; ovarian cyst (?ruptured hemorrhagic) vs. ovarian torsion vs. colon infection vs. UTI/pyelonephritis; PLAN--TVUS, CTAP with contrast, pain control, IVF, labs, UA/UCx, re-eval

## 2017-09-12 NOTE — ED PROVIDER NOTE - OBJECTIVE STATEMENT
46 yo female G0, no longer menstruating s/p partial hysterectomy (but no removal of ovaries), in ED with 4+ weeks of left pelvic pain, constant but intermittently worse above baseline.  Not worse with movement.  Associated with no N/V/D or CP/SOB. 48 yo female G0, no longer menstruating s/p partial hysterectomy (but no removal of ovaries), in ED with 4+ weeks of left pelvic pain, constant but intermittently worse above baseline.  Not worse with movement.  Pt endorses urinary frequency but no urgency/hematuria and no vaginal bleeding or discharge.  No N/V/D or CP/SOB or fever.  Was evaluated for this at her OBGYN (Dr. Neela Haile at [facility]) after symptoms began and had TVUS showing "a cyst", is in the process of scheduling MRI for further characterization.  S/p ibuprofen 800 mg this morning without relief.

## 2017-09-12 NOTE — ED SUB INTERN NOTE - PROGRESS NOTE DETAILS
Pt reports improved pain control after morphine; does not request further dosing; she was updated with plan to consult obgyn for pelvic pain symptoms; reviewed TVUS and CT results Pt VS: /104, Pulse 100 bpm, RR 16, T 96.6F, O2sat 100% RA; spoke with OBGYN on call who is contacting attending physician; will call back with update.

## 2017-09-13 LAB — SPECIMEN SOURCE: SIGNIFICANT CHANGE UP

## 2017-09-14 LAB
-  AMIKACIN: SIGNIFICANT CHANGE UP
-  AMPICILLIN/SULBACTAM: SIGNIFICANT CHANGE UP
-  AMPICILLIN: SIGNIFICANT CHANGE UP
-  AZTREONAM: SIGNIFICANT CHANGE UP
-  CEFAZOLIN: SIGNIFICANT CHANGE UP
-  CEFEPIME: SIGNIFICANT CHANGE UP
-  CEFOXITIN: SIGNIFICANT CHANGE UP
-  CEFTAZIDIME: SIGNIFICANT CHANGE UP
-  CEFTRIAXONE: SIGNIFICANT CHANGE UP
-  CIPROFLOXACIN: SIGNIFICANT CHANGE UP
-  ERTAPENEM: SIGNIFICANT CHANGE UP
-  GENTAMICIN: SIGNIFICANT CHANGE UP
-  IMIPENEM: SIGNIFICANT CHANGE UP
-  LEVOFLOXACIN: SIGNIFICANT CHANGE UP
-  MEROPENEM: SIGNIFICANT CHANGE UP
-  NITROFURANTOIN: SIGNIFICANT CHANGE UP
-  PIPERACILLIN/TAZOBACTAM: SIGNIFICANT CHANGE UP
-  TIGECYCLINE: SIGNIFICANT CHANGE UP
-  TOBRAMYCIN: SIGNIFICANT CHANGE UP
-  TRIMETHOPRIM/SULFAMETHOXAZOLE: SIGNIFICANT CHANGE UP
BACTERIA UR CULT: SIGNIFICANT CHANGE UP
METHOD TYPE: SIGNIFICANT CHANGE UP
ORGANISM # SPEC MICROSCOPIC CNT: SIGNIFICANT CHANGE UP
ORGANISM # SPEC MICROSCOPIC CNT: SIGNIFICANT CHANGE UP

## 2018-09-05 NOTE — H&P PST ADULT - SOURCE OF INFORMATION, PROFILE
Progress Note - Pulmonary   Mateo Jeffers 68 y o  male MRN: [id number]   Encounter: [id number]      Assessment/Plan:  Patient is a 80-year-old male with history of lung adenocarcinoma with metastatic disease, pulmonary fibrosis chronic hypoxia with oxygen dependence who presents for follow-up  The patient reports overall he is not doing well from a respiratory standpoint  He is previously on 3 L pulse flow but was not feeling as if he was getting adequate oxygen or able to trigger it  He was transition to a continuous flow and felt better on the office visit  We will reach out to his oxygen supply company and transition from pulse flow to continuous flow  In addition, we will check an overnight pulse oximetry given the patient's feeling poorly  It is unclear exactly how much of his current condition is related to hypoxia  The patient may follow up as needed after testing is completed  Plan:  Orders Placed This Encounter   Procedures    Pulse oximetry overnight     Order Specific Question:   Room Air     Answer:   Yes     Order Specific Question:   Room Air with/without Therapy     Answer:   no equipment       Subjective:   He is using 3L pulsed flow but reports he is not doing well with this  He feels better with the continuous flow  The patient overall feels about the same  He denies fevers, chills, nausea or vomiting  He does report being limited in his activities  Inhaler Regimen:  None    Remainder of 12 point review of systems negative except as described in HPI  The following portions of the patient's history were reviewed and updated as appropriate: allergies, current medications, past family history, past medical history, past social history, past surgical history and problem list      Objective:   Vitals: Blood pressure 118/72, pulse 104, temperature 98 7 °F (37 1 °C), temperature source Tympanic, resp  rate 18, height 5' 6" (1 676 m), weight 78 2 kg (172 lb 6 4 oz), SpO2 97 %  , patient 3L continuous flow, Body mass index is 27 83 kg/m²  Physical Exam  Gen: Awake, alert, oriented x 3, no acute distress  HEENT: Mucous membranes moist, no oral lesions, no thrush  NECK: No accessory muscle use, JVP not elevated  Cardiac: Regular, single S1, single S2, no murmurs, no rubs, no gallops  Lungs: slightly diminished breath sounds    Abdomen: normoactive bowel sounds, soft nontender, nondistended, no rebound or rigidity, no guarding  Extremities: no cyanosis, no clubbing, no edema  MSK:  Strength equal in all extremities  Derm:  No rashes/lesions noted  Neuro:  Appropriate mood/affect    Labs: I have personally reviewed pertinent lab results  Lab Results   Component Value Date    CALCIUM 9 4 08/27/2018     08/27/2018    K 4 3 08/27/2018    CO2 28 08/27/2018     08/27/2018    BUN 19 08/27/2018    CREATININE 1 11 08/27/2018     Lab Results   Component Value Date    WBC 6 10 08/27/2018    HGB 10 6 (L) 08/27/2018    HCT 33 6 (L) 08/27/2018     (H) 08/27/2018     08/27/2018     Imaging and other studies: I have personally reviewed pertinent reports  and I have personally reviewed pertinent films in PACS  8/20/2018:  LUNGS: Dominant left lower lobe nodule now measures 9 x 9 mm (series 3 image 106)  There are bilateral subpleural reticular opacities most prominent peripherally and at the lung bases  There is suggestion of early honeycombing present at the bilateral   lung bases  There is bronchiectasis at the right greater than left lung bases  Stable 2 mm right upper lobe nodule (series 4 image 17)  Stable 5 mm right middle lobe nodule (series 4 image 38)  Stable 3 mm left upper lobe nodule (series 4 image 13)      PLEURA:  Trace right pleural effusion with a small amount of fluid tracking along the right-sided fissure  Pulmonary Function Testing: I have personally reviewed pertinent reports     and I have personally reviewed pertinent films in PACS  1/11/2018:  FEV1/FVC Ratio: 79 %  Forced Vital Capacity: 3 19 L    87 % predicted  FEV1: 2 53 L     96 % predicted  Lung volumes by body plethysmography: Total Lung Capacity 79 % predicted Residual volume 94 % predicted  DLCO corrected for patients hemoglobin level: 64 %    Altagracia Cunningham

## 2018-09-07 ENCOUNTER — RESULT REVIEW (OUTPATIENT)
Age: 48
End: 2018-09-07

## 2020-02-13 NOTE — ED SUB INTERN NOTE - MUSCULOSKELETAL NEGATIVE STATEMENT, MLM
SUBJECTIVE:     Ashley Goodwin is a 6 month old female, here for a routine health maintenance visit.    Patient was roomed by: David Ulrich CMA    Well Child     Social History  Patient accompanied by:  Mother  Questions or concerns?: No    Forms to complete? No  Child lives with::  Mother and brother  Who takes care of your child?:  Maternal grandmother  Languages spoken in the home:  English  Recent family changes/ special stressors?:  None noted    Safety / Health Risk  Is your child around anyone who smokes?  No    TB Exposure:     No TB exposure    Car seat < 6 years old, in  back seat, rear-facing, 5-point restraint? Yes    Home Safety Survey:      Stairs Gated?:  Not Applicable     Wood stove / Fireplace screened?  Not applicable     Poisons / cleaning supplies out of reach?:  Yes     Swimming pool?:  No     Firearms in the home?: No      Hearing / Vision  Hearing or vision concerns?  No concerns, hearing and vision subjectively normal    Daily Activities    Water source:  City water and bottled water  Nutrition:  Formula  Formula:  Simiilac  Vitamins & Supplements:  No    Elimination       Urinary frequency:more than 6 times per 24 hours     Stool frequency: 1-3 times per 24 hours     Stool consistency: soft     Elimination problems:  None    Sleep      Sleep arrangement:co-sleeping with parent    Sleep position:  On back, on side and on stomach    Sleep pattern: wakes at night for feedings, regular bedtime routine, waking at night, feeding to sleep and naps (add details)      [place]  Depression Scale (EPDS) Risk Assessment: Completed      Dental visit recommended: No  Dental varnish not indicated, no teeth    DEVELOPMENT  Screening tool used, reviewed with parent/guardian:   ASQ 6 M Communication Gross Motor Fine Motor Problem Solving Personal-social   Score 60 50 60 60 60   Cutoff 29.65 22.25 25.14 27.72 25.34   Result Passed Passed Passed Passed Passed         PROBLEM LIST  Patient Active  "Problem List   Diagnosis     Umbilical hernia without obstruction and without gangrene     Accessory nipple in female     MEDICATIONS  No current outpatient medications on file.      ALLERGY  No Known Allergies    IMMUNIZATIONS  Immunization History   Administered Date(s) Administered     DTAP-IPV/HIB (PENTACEL) 2019, 2019     Hep B, Peds or Adolescent 2019, 2019     Pneumo Conj 13-V (2010&after) 2019, 2019     Rotavirus, monovalent, 2-dose 2019, 2019       HEALTH HISTORY SINCE LAST VISIT  No surgery, major illness or injury since last physical exam    ROS  Constitutional, eye, ENT, skin, respiratory, cardiac, and GI are normal except as otherwise noted.    OBJECTIVE:   EXAM  Pulse 138   Temp 98.8  F (37.1  C)   Resp 22   Ht 2' 2\" (0.66 m)   Wt 15 lb 11 oz (7.116 kg)   HC 16.5\" (41.9 cm)   SpO2 100%   BMI 16.32 kg/m    37 %ile based on WHO (Girls, 0-2 years) head circumference-for-age based on Head Circumference recorded on 2/13/2020.  38 %ile based on WHO (Girls, 0-2 years) weight-for-age data based on Weight recorded on 2/13/2020.  49 %ile based on WHO (Girls, 0-2 years) Length-for-age data based on Length recorded on 2/13/2020.  38 %ile based on WHO (Girls, 0-2 years) weight-for-recumbent length based on body measurements available as of 2/13/2020.  GENERAL: Active, alert,  no  distress.  SKIN: 5mm dark brown nevus on anterior right shoulder; pink papules on cheeks; skin otherwise clear  HEAD: Normocephalic. Normal fontanels and sutures.  EYES: Conjunctivae and cornea normal. Red reflexes present bilaterally.  EARS: normal: no effusions, no erythema, normal landmarks  NOSE: Normal without discharge.  MOUTH/THROAT: Clear. No oral lesions.  NECK: Supple, no masses.  LYMPH NODES: No adenopathy  LUNGS: Clear. No rales, rhonchi, wheezing or retractions  HEART: Regular rate and rhythm. Normal S1/S2. No murmurs. Normal femoral pulses.  ABDOMEN: Soft, non-tender, not " distended, no masses or hepatosplenomegaly. 1 cm umbilical hernia, easily reducible; and normal bowel sounds.   GENITALIA: Normal female external genitalia. Jer stage I,  No inguinal herniae are present.  EXTREMITIES: Hips normal with negative Ortolani and Proctor. Symmetric creases and  no deformities  NEUROLOGIC: Normal tone throughout. Normal reflexes for age    ASSESSMENT/PLAN:       ICD-10-CM    1. Encounter for routine child health examination w/o abnormal findings Z00.129 MATERNAL HEALTH RISK ASSESSMENT (38808)- EPDS     DTAP - HIB - IPV VACCINE, IM USE (Pentacel) [75839]     HEPATITIS B VACCINE,PED/ADOL,IM [08343]     PNEUMOCOCCAL CONJ VACCINE 13 VALENT IM [01021]       Anticipatory Guidance  The following topics were discussed:  SOCIAL/ FAMILY:    reading to child    Reach Out & Read--book given  NUTRITION:    advancement of solid foods    breastfeeding or formula for 1 year  HEALTH/ SAFETY:    sleep patterns    teething/ dental care    childproof home    Preventive Care Plan   Immunizations   See orders in EpicCare.  I reviewed the signs and symptoms of adverse effects and when to seek medical care if they should arise.  Declined flu vaccine  Referrals/Ongoing Specialty care: No   See other orders in EpicCare    Resources:  [place] Child and Teen Checkups (C&TC) Schedule of Age-Related Screening Standards    FOLLOW-UP:    9 month Preventive Care visit    Jesus Castellon MD  [facility]   no back pain, no gout, no musculoskeletal pain, no neck pain, and no weakness.

## 2020-11-20 ENCOUNTER — RESULT REVIEW (OUTPATIENT)
Age: 50
End: 2020-11-20

## 2021-01-11 NOTE — ED PROVIDER NOTE - CROS ED GI ALL NEG
Patient was notified of results and recommendations. Patient understood and has no further questions at this time.   - - -

## 2021-06-23 NOTE — H&P PST ADULT - MARITAL STATUS
Addended by: JANICE MERCADO on: 6/23/2021 09:02 AM     Modules accepted: Level of Service     no children/

## 2023-01-26 NOTE — ASU PREOP CHECKLIST - NS PREOP CHK HIBICLENS NA
Take antibiotic as directed.   Drink plenty of fluids, mostly water.  Immodium over the counter for antidiarrheal as long as you don't have a fever.  Stay away from fruit juices, foods high in fiber and foods with high sugar content as these can cause diarrhea.  Present to primary care provider if symptoms worsen, you develop a high fever, severe weakness or fainting, increased abdominal pain, blood in stool or vomit, or failure to improve in 10 days.    N/A

## 2024-02-09 ENCOUNTER — EMERGENCY (EMERGENCY)
Facility: HOSPITAL | Age: 54
LOS: 1 days | Discharge: HOME | End: 2024-02-09
Attending: EMERGENCY MEDICINE | Admitting: EMERGENCY MEDICINE
Payer: COMMERCIAL

## 2024-02-09 VITALS
TEMPERATURE: 98 F | HEART RATE: 118 BPM | DIASTOLIC BLOOD PRESSURE: 115 MMHG | RESPIRATION RATE: 19 BRPM | SYSTOLIC BLOOD PRESSURE: 172 MMHG | OXYGEN SATURATION: 95 %

## 2024-02-09 DIAGNOSIS — Z98.890 OTHER SPECIFIED POSTPROCEDURAL STATES: Chronic | ICD-10-CM

## 2024-02-09 DIAGNOSIS — Z90.711 ACQUIRED ABSENCE OF UTERUS WITH REMAINING CERVICAL STUMP: Chronic | ICD-10-CM

## 2024-02-09 LAB
A1C WITH ESTIMATED AVERAGE GLUCOSE RESULT: 11 % — HIGH (ref 4–5.6)
ALBUMIN SERPL ELPH-MCNC: 4.8 G/DL — SIGNIFICANT CHANGE UP (ref 3.3–5)
ALP SERPL-CCNC: 101 U/L — SIGNIFICANT CHANGE UP (ref 40–120)
ALT FLD-CCNC: 21 U/L — SIGNIFICANT CHANGE UP (ref 4–33)
ANION GAP SERPL CALC-SCNC: 14 MMOL/L — SIGNIFICANT CHANGE UP (ref 7–14)
ANION GAP SERPL CALC-SCNC: 16 MMOL/L — HIGH (ref 7–14)
APPEARANCE UR: CLEAR — SIGNIFICANT CHANGE UP
AST SERPL-CCNC: 16 U/L — SIGNIFICANT CHANGE UP (ref 4–32)
B-OH-BUTYR SERPL-SCNC: 1.1 MMOL/L — HIGH (ref 0–0.4)
BASE EXCESS BLDV CALC-SCNC: 3.4 MMOL/L — HIGH (ref -2–3)
BASOPHILS # BLD AUTO: 0.03 K/UL — SIGNIFICANT CHANGE UP (ref 0–0.2)
BASOPHILS NFR BLD AUTO: 0.5 % — SIGNIFICANT CHANGE UP (ref 0–2)
BILIRUB SERPL-MCNC: 0.4 MG/DL — SIGNIFICANT CHANGE UP (ref 0.2–1.2)
BILIRUB UR-MCNC: NEGATIVE — SIGNIFICANT CHANGE UP
BLOOD GAS VENOUS COMPREHENSIVE RESULT: SIGNIFICANT CHANGE UP
BUN SERPL-MCNC: 13 MG/DL — SIGNIFICANT CHANGE UP (ref 7–23)
BUN SERPL-MCNC: 14 MG/DL — SIGNIFICANT CHANGE UP (ref 7–23)
CALCIUM SERPL-MCNC: 11 MG/DL — HIGH (ref 8.4–10.5)
CALCIUM SERPL-MCNC: 9.9 MG/DL — SIGNIFICANT CHANGE UP (ref 8.4–10.5)
CHLORIDE BLDV-SCNC: 94 MMOL/L — LOW (ref 96–108)
CHLORIDE SERPL-SCNC: 92 MMOL/L — LOW (ref 98–107)
CHLORIDE SERPL-SCNC: 97 MMOL/L — LOW (ref 98–107)
CO2 BLDV-SCNC: 32.1 MMOL/L — HIGH (ref 22–26)
CO2 SERPL-SCNC: 25 MMOL/L — SIGNIFICANT CHANGE UP (ref 22–31)
CO2 SERPL-SCNC: 25 MMOL/L — SIGNIFICANT CHANGE UP (ref 22–31)
COLOR SPEC: YELLOW — SIGNIFICANT CHANGE UP
CREAT SERPL-MCNC: 1.01 MG/DL — SIGNIFICANT CHANGE UP (ref 0.5–1.3)
CREAT SERPL-MCNC: 1.08 MG/DL — SIGNIFICANT CHANGE UP (ref 0.5–1.3)
DIFF PNL FLD: NEGATIVE — SIGNIFICANT CHANGE UP
EGFR: 61 ML/MIN/1.73M2 — SIGNIFICANT CHANGE UP
EGFR: 67 ML/MIN/1.73M2 — SIGNIFICANT CHANGE UP
EOSINOPHIL # BLD AUTO: 0.06 K/UL — SIGNIFICANT CHANGE UP (ref 0–0.5)
EOSINOPHIL NFR BLD AUTO: 1.1 % — SIGNIFICANT CHANGE UP (ref 0–6)
ESTIMATED AVERAGE GLUCOSE: 269 — SIGNIFICANT CHANGE UP
GAS PNL BLDV: 131 MMOL/L — LOW (ref 136–145)
GLUCOSE BLDV-MCNC: 494 MG/DL — CRITICAL HIGH (ref 70–99)
GLUCOSE SERPL-MCNC: 401 MG/DL — HIGH (ref 70–99)
GLUCOSE SERPL-MCNC: 517 MG/DL — CRITICAL HIGH (ref 70–99)
GLUCOSE UR QL: >=1000 MG/DL
HCG SERPL-ACNC: 4.7 MIU/ML — SIGNIFICANT CHANGE UP
HCO3 BLDV-SCNC: 30 MMOL/L — HIGH (ref 22–29)
HCT VFR BLD CALC: 39.9 % — SIGNIFICANT CHANGE UP (ref 34.5–45)
HCT VFR BLDA CALC: 41 % — SIGNIFICANT CHANGE UP (ref 34.5–46.5)
HGB BLD CALC-MCNC: 13.8 G/DL — SIGNIFICANT CHANGE UP (ref 11.7–16.1)
HGB BLD-MCNC: 13.5 G/DL — SIGNIFICANT CHANGE UP (ref 11.5–15.5)
IANC: 3.52 K/UL — SIGNIFICANT CHANGE UP (ref 1.8–7.4)
IMM GRANULOCYTES NFR BLD AUTO: 0.4 % — SIGNIFICANT CHANGE UP (ref 0–0.9)
KETONES UR-MCNC: 15 MG/DL
LACTATE BLDV-MCNC: 2.1 MMOL/L — HIGH (ref 0.5–2)
LACTATE SERPL-SCNC: 1.3 MMOL/L — SIGNIFICANT CHANGE UP (ref 0.5–2)
LEUKOCYTE ESTERASE UR-ACNC: NEGATIVE — SIGNIFICANT CHANGE UP
LIDOCAIN IGE QN: 68 U/L — HIGH (ref 7–60)
LYMPHOCYTES # BLD AUTO: 1.6 K/UL — SIGNIFICANT CHANGE UP (ref 1–3.3)
LYMPHOCYTES # BLD AUTO: 28.9 % — SIGNIFICANT CHANGE UP (ref 13–44)
MAGNESIUM SERPL-MCNC: 2.4 MG/DL — SIGNIFICANT CHANGE UP (ref 1.6–2.6)
MCHC RBC-ENTMCNC: 27.9 PG — SIGNIFICANT CHANGE UP (ref 27–34)
MCHC RBC-ENTMCNC: 33.8 GM/DL — SIGNIFICANT CHANGE UP (ref 32–36)
MCV RBC AUTO: 82.4 FL — SIGNIFICANT CHANGE UP (ref 80–100)
MONOCYTES # BLD AUTO: 0.3 K/UL — SIGNIFICANT CHANGE UP (ref 0–0.9)
MONOCYTES NFR BLD AUTO: 5.4 % — SIGNIFICANT CHANGE UP (ref 2–14)
NEUTROPHILS # BLD AUTO: 3.52 K/UL — SIGNIFICANT CHANGE UP (ref 1.8–7.4)
NEUTROPHILS NFR BLD AUTO: 63.7 % — SIGNIFICANT CHANGE UP (ref 43–77)
NITRITE UR-MCNC: NEGATIVE — SIGNIFICANT CHANGE UP
NRBC # BLD: 0 /100 WBCS — SIGNIFICANT CHANGE UP (ref 0–0)
NRBC # FLD: 0 K/UL — SIGNIFICANT CHANGE UP (ref 0–0)
PCO2 BLDV: 55 MMHG — HIGH (ref 39–52)
PH BLDV: 7.35 — SIGNIFICANT CHANGE UP (ref 7.32–7.43)
PH UR: 6.5 — SIGNIFICANT CHANGE UP (ref 5–8)
PHOSPHATE SERPL-MCNC: 3.9 MG/DL — SIGNIFICANT CHANGE UP (ref 2.5–4.5)
PLATELET # BLD AUTO: 205 K/UL — SIGNIFICANT CHANGE UP (ref 150–400)
PO2 BLDV: 33 MMHG — SIGNIFICANT CHANGE UP (ref 25–45)
POTASSIUM BLDV-SCNC: 4.6 MMOL/L — SIGNIFICANT CHANGE UP (ref 3.5–5.1)
POTASSIUM SERPL-MCNC: 3.9 MMOL/L — SIGNIFICANT CHANGE UP (ref 3.5–5.3)
POTASSIUM SERPL-MCNC: 4.6 MMOL/L — SIGNIFICANT CHANGE UP (ref 3.5–5.3)
POTASSIUM SERPL-SCNC: 3.9 MMOL/L — SIGNIFICANT CHANGE UP (ref 3.5–5.3)
POTASSIUM SERPL-SCNC: 4.6 MMOL/L — SIGNIFICANT CHANGE UP (ref 3.5–5.3)
PROT SERPL-MCNC: 8.3 G/DL — SIGNIFICANT CHANGE UP (ref 6–8.3)
PROT UR-MCNC: SIGNIFICANT CHANGE UP MG/DL
RBC # BLD: 4.84 M/UL — SIGNIFICANT CHANGE UP (ref 3.8–5.2)
RBC # FLD: 11.9 % — SIGNIFICANT CHANGE UP (ref 10.3–14.5)
SAO2 % BLDV: 49.5 % — LOW (ref 67–88)
SODIUM SERPL-SCNC: 133 MMOL/L — LOW (ref 135–145)
SODIUM SERPL-SCNC: 136 MMOL/L — SIGNIFICANT CHANGE UP (ref 135–145)
SP GR SPEC: 1.04 — HIGH (ref 1–1.03)
UROBILINOGEN FLD QL: 0.2 MG/DL — SIGNIFICANT CHANGE UP (ref 0.2–1)
WBC # BLD: 5.53 K/UL — SIGNIFICANT CHANGE UP (ref 3.8–10.5)
WBC # FLD AUTO: 5.53 K/UL — SIGNIFICANT CHANGE UP (ref 3.8–10.5)

## 2024-02-09 PROCEDURE — 93010 ELECTROCARDIOGRAM REPORT: CPT

## 2024-02-09 PROCEDURE — 99223 1ST HOSP IP/OBS HIGH 75: CPT

## 2024-02-09 RX ORDER — DEXTROSE 50 % IN WATER 50 %
15 SYRINGE (ML) INTRAVENOUS ONCE
Refills: 0 | Status: DISCONTINUED | OUTPATIENT
Start: 2024-02-09 | End: 2024-02-13

## 2024-02-09 RX ORDER — SODIUM CHLORIDE 9 MG/ML
1000 INJECTION, SOLUTION INTRAVENOUS
Refills: 0 | Status: DISCONTINUED | OUTPATIENT
Start: 2024-02-09 | End: 2024-02-13

## 2024-02-09 RX ORDER — DEXTROSE 50 % IN WATER 50 %
25 SYRINGE (ML) INTRAVENOUS ONCE
Refills: 0 | Status: DISCONTINUED | OUTPATIENT
Start: 2024-02-09 | End: 2024-02-13

## 2024-02-09 RX ORDER — DEXTROSE 50 % IN WATER 50 %
12.5 SYRINGE (ML) INTRAVENOUS ONCE
Refills: 0 | Status: DISCONTINUED | OUTPATIENT
Start: 2024-02-09 | End: 2024-02-13

## 2024-02-09 RX ORDER — INSULIN LISPRO 100/ML
VIAL (ML) SUBCUTANEOUS
Refills: 0 | Status: DISCONTINUED | OUTPATIENT
Start: 2024-02-09 | End: 2024-02-13

## 2024-02-09 RX ORDER — INSULIN LISPRO 100/ML
4 VIAL (ML) SUBCUTANEOUS
Refills: 0 | Status: DISCONTINUED | OUTPATIENT
Start: 2024-02-09 | End: 2024-02-13

## 2024-02-09 RX ORDER — SODIUM CHLORIDE 9 MG/ML
1000 INJECTION INTRAMUSCULAR; INTRAVENOUS; SUBCUTANEOUS ONCE
Refills: 0 | Status: COMPLETED | OUTPATIENT
Start: 2024-02-09 | End: 2024-02-09

## 2024-02-09 RX ORDER — INSULIN LISPRO 100/ML
VIAL (ML) SUBCUTANEOUS AT BEDTIME
Refills: 0 | Status: DISCONTINUED | OUTPATIENT
Start: 2024-02-09 | End: 2024-02-13

## 2024-02-09 RX ORDER — INSULIN GLARGINE 100 [IU]/ML
13 INJECTION, SOLUTION SUBCUTANEOUS ONCE
Refills: 0 | Status: COMPLETED | OUTPATIENT
Start: 2024-02-09 | End: 2024-02-09

## 2024-02-09 RX ORDER — GLUCAGON INJECTION, SOLUTION 0.5 MG/.1ML
1 INJECTION, SOLUTION SUBCUTANEOUS ONCE
Refills: 0 | Status: DISCONTINUED | OUTPATIENT
Start: 2024-02-09 | End: 2024-02-13

## 2024-02-09 RX ORDER — INSULIN LISPRO 100/ML
5 VIAL (ML) SUBCUTANEOUS ONCE
Refills: 0 | Status: COMPLETED | OUTPATIENT
Start: 2024-02-09 | End: 2024-02-09

## 2024-02-09 RX ADMIN — SODIUM CHLORIDE 1000 MILLILITER(S): 9 INJECTION INTRAMUSCULAR; INTRAVENOUS; SUBCUTANEOUS at 16:14

## 2024-02-09 RX ADMIN — INSULIN GLARGINE 13 UNIT(S): 100 INJECTION, SOLUTION SUBCUTANEOUS at 22:57

## 2024-02-09 RX ADMIN — SODIUM CHLORIDE 1000 MILLILITER(S): 9 INJECTION INTRAMUSCULAR; INTRAVENOUS; SUBCUTANEOUS at 17:41

## 2024-02-09 RX ADMIN — Medication 5 UNIT(S): at 18:15

## 2024-02-09 RX ADMIN — SODIUM CHLORIDE 1000 MILLILITER(S): 9 INJECTION INTRAMUSCULAR; INTRAVENOUS; SUBCUTANEOUS at 20:20

## 2024-02-09 NOTE — ED PROVIDER NOTE - OBJECTIVE STATEMENT
54 yo F PMHx of hysterectomy presents with high blood sugars outpatient. Patient has recently had increased thirst and urination and blurry vision so went to PCP today where she was given an electronic blood sugar monitor. Patient was told that she has blood sugars 700+. No abd pain, nausea, vomiting, fevers, chills, cough, runny nose, dysuria. She has never been on medication for diabetes before.

## 2024-02-09 NOTE — ED PROVIDER NOTE - CLINICAL SUMMARY MEDICAL DECISION MAKING FREE TEXT BOX
54 yo F hx of hysterectomy presents with high blood sugars outpatient with polydipsia and polyuria, blurry vision - vss, fsg 600+, nonfocal exam, concern for dka/hhs. Will rule out with labs, admission vs cdu.

## 2024-02-09 NOTE — ED ADULT TRIAGE NOTE - CHIEF COMPLAINT QUOTE
pt c/o dizziness and dry mouth x 1 wk. pt endorses b/l blurry vision x 2 days. pt sent by MD for glucose 759 on blood work. FS "HI" in triage.

## 2024-02-09 NOTE — ED ADULT NURSE NOTE - OBJECTIVE STATEMENT
Pt walked in sent from pcp for high glucose as per pt went for regular check up yesterday results noted glucose to be over 600 denies any pain or discomfort at this time pending md neo hartley rn

## 2024-02-09 NOTE — ED ADULT NURSE REASSESSMENT NOTE - NS ED NURSE REASSESS COMMENT FT1
Pt not given sliding dose insulin as per EMAR, fs = 246.  Report given to CDU.  Pt transporting to CDU3.

## 2024-02-09 NOTE — ED CDU PROVIDER INITIAL DAY NOTE - OBJECTIVE STATEMENT
52 yo F PMHx of hysterectomy presents with high blood sugars outpatient. Patient has recently had increased thirst and urination and blurry vision so went to PCP today where she was given an electronic blood sugar monitor. Patient was told that she has blood sugars 700+. No abd pain, nausea, vomiting, fevers, chills, cough, runny nose, dysuria. She has never been on medication for diabetes before.    CDU PA: 52 yo F with new onset DM A1C of 11.0 , FS >600, initial gap 16 > 14. glucose 400 after fluids and 5 U admelog. No acidosis, BHB 1.1. will start patient on weight based insulin, satish GARCIA emailed

## 2024-02-09 NOTE — ED PROVIDER NOTE - ATTENDING CONTRIBUTION TO CARE
Dr. Rajan: 52 yo female with no sig PMH, PSH of hysterectomy, in ED sent by PCP due to hyperglycemia.  Pt notes that for a few weeks she has had increased thirst and she is urinating "every 2 minutes", and for 2 days she has had blurry vision that makes her glasses seem like they are not working.  No fever, N/V/D, abdominal pain or other complaints.  Pt not on steroids or other new medications.  She spoke to her PCP who placed her on a glucose monitor, and today she was told to come to the ED because the glucose monitor was reading in the 700s.  Pt's mother has DM but was not diagnosed until close to her 80s.  On exam pt well appearing, in NAD, heart RRR, lungs CTAB, abd NTND, extremities without swelling, strength 5/5 in all extremities and skin without rash.    I, Eduar Rajan MD, personally made/approved the management plan for this patient and take responsibility for the patient's management. Dr. Rajan: 54 yo female with no sig PMH, PSH of hysterectomy, in ED sent by PCP due to hyperglycemia.  Pt notes that for a few weeks she has had increased thirst and she is urinating "every 2 minutes", and for 2 days she has had blurry vision that makes her glasses seem like they are not working.  No fever, N/V/D, abdominal pain or other complaints.  Pt not on steroids or other new medications.  She spoke to her PCP who placed her on a glucose monitor, and today she was told to come to the ED because the glucose monitor was reading in the 700s.  Pt's mother has DM but was not diagnosed until close to her 80s.  On exam pt well appearing, in NAD, heart RRR, lungs CTAB, abd NTND, extremities without swelling, strength 5/5 in all extremities and skin without rash    IEduar MD, personally made/approved the management plan for this patient and take responsibility for the patient's management.

## 2024-02-09 NOTE — ED CDU PROVIDER INITIAL DAY NOTE - CLINICAL SUMMARY MEDICAL DECISION MAKING FREE TEXT BOX
54 yo F with new onset DM A1C of 11.0 , FS >600, initial gap 16 > 14. glucose 400 after fluids and 5 U admelog. No acidosis, BHB 1.1. will start patient on weight based insulin, satish GARCIA emailed

## 2024-02-10 VITALS
TEMPERATURE: 98 F | DIASTOLIC BLOOD PRESSURE: 65 MMHG | RESPIRATION RATE: 16 BRPM | OXYGEN SATURATION: 97 % | HEART RATE: 99 BPM | SYSTOLIC BLOOD PRESSURE: 112 MMHG

## 2024-02-10 DIAGNOSIS — I10 ESSENTIAL (PRIMARY) HYPERTENSION: ICD-10-CM

## 2024-02-10 DIAGNOSIS — E11.65 TYPE 2 DIABETES MELLITUS WITH HYPERGLYCEMIA: ICD-10-CM

## 2024-02-10 DIAGNOSIS — E78.5 HYPERLIPIDEMIA, UNSPECIFIED: ICD-10-CM

## 2024-02-10 LAB
ANION GAP SERPL CALC-SCNC: 15 MMOL/L — HIGH (ref 7–14)
B-OH-BUTYR SERPL-SCNC: 1.2 MMOL/L — HIGH (ref 0–0.4)
BUN SERPL-MCNC: 12 MG/DL — SIGNIFICANT CHANGE UP (ref 7–23)
CALCIUM SERPL-MCNC: 9.6 MG/DL — SIGNIFICANT CHANGE UP (ref 8.4–10.5)
CHLORIDE SERPL-SCNC: 96 MMOL/L — LOW (ref 98–107)
CO2 SERPL-SCNC: 25 MMOL/L — SIGNIFICANT CHANGE UP (ref 22–31)
CREAT SERPL-MCNC: 1.05 MG/DL — SIGNIFICANT CHANGE UP (ref 0.5–1.3)
EGFR: 64 ML/MIN/1.73M2 — SIGNIFICANT CHANGE UP
GLUCOSE SERPL-MCNC: 328 MG/DL — HIGH (ref 70–99)
LIDOCAIN IGE QN: 48 U/L — SIGNIFICANT CHANGE UP (ref 7–60)
POTASSIUM SERPL-MCNC: 4 MMOL/L — SIGNIFICANT CHANGE UP (ref 3.5–5.3)
POTASSIUM SERPL-SCNC: 4 MMOL/L — SIGNIFICANT CHANGE UP (ref 3.5–5.3)
SODIUM SERPL-SCNC: 136 MMOL/L — SIGNIFICANT CHANGE UP (ref 135–145)

## 2024-02-10 PROCEDURE — 99285 EMERGENCY DEPT VISIT HI MDM: CPT | Mod: GC

## 2024-02-10 PROCEDURE — 99238 HOSP IP/OBS DSCHRG MGMT 30/<: CPT

## 2024-02-10 RX ORDER — METFORMIN HYDROCHLORIDE 850 MG/1
1 TABLET ORAL
Qty: 60 | Refills: 0
Start: 2024-02-10

## 2024-02-10 RX ORDER — ISOPROPYL ALCOHOL, BENZOCAINE .7; .06 ML/ML; ML/ML
0 SWAB TOPICAL
Qty: 100 | Refills: 1
Start: 2024-02-10

## 2024-02-10 RX ORDER — INSULIN GLARGINE 100 [IU]/ML
17 INJECTION, SOLUTION SUBCUTANEOUS
Qty: 5 | Refills: 0
Start: 2024-02-10 | End: 2024-03-10

## 2024-02-10 RX ADMIN — Medication 4 UNIT(S): at 07:42

## 2024-02-10 RX ADMIN — Medication 5: at 07:43

## 2024-02-10 RX ADMIN — Medication 3: at 11:54

## 2024-02-10 RX ADMIN — Medication 4 UNIT(S): at 11:54

## 2024-02-10 NOTE — ED CDU PROVIDER SUBSEQUENT DAY NOTE - CLINICAL SUMMARY MEDICAL DECISION MAKING FREE TEXT BOX
52 yo F with new onset DM A1C of 11.0 , FS >600, initial gap 16 > 14. glucose 400 after fluids and 5 U admelog. No acidosis, BHB 1.1. will start patient on weight based insulin, satish GARCIA emailed

## 2024-02-10 NOTE — ED CDU PROVIDER SUBSEQUENT DAY NOTE - HISTORY
54 yo F with new onset DM A1C of 11.0 , FS >600, initial gap 16 > 14. glucose 400 after fluids and 5 U admelog. No acidosis, BHB 1.1. will start patient on weight based insulin, LDSS, endo emailed  interval FS 240s, patient resting comfortably, pending endo eval tomorrow

## 2024-02-10 NOTE — CONSULT NOTE ADULT - SUBJECTIVE AND OBJECTIVE BOX
ENDOCRINE INITIAL CONSULT - t2dm    HPI:  Ms. Oconnor is a 53 year old female with no significant PMHx presenting with polyuria, polydipsia.    ENDOCRINE HISTORY   Diagnosed with DM:   Last HbA1c: 11  Endocrinologist:   Home DM Meds:  Adherence:  Microvascular complications: denies retinopathy, nephropathy, neuropathy  Macrovascular complications: denies MI or CVA  SMBG:  Symptoms:  Hypoglycemia episodes:  BG at home:  Diet at home:  Appetite in hospital:  Exercise:  PMHx:  PSHx:  Family hx:  Social hx:  Insurance: [employer] PPO  [facility]      PAST MEDICAL & SURGICAL HISTORY:  Uterine leiomyoma      History of ovarian cystectomy  2014      History of dilation and curettage  2015 2016      History of endometrial ablation  2016      H/O abdominal supracervical subtotal hysterectomy  b/l salpingectomy          FAMILY HISTORY:  Family history of diabetes mellitus (Mother)        Social History:      Home Medications:      MEDICATIONS  (STANDING):  dextrose 5%. 1000 milliLiter(s) (50 mL/Hr) IV Continuous <Continuous>  dextrose 5%. 1000 milliLiter(s) (100 mL/Hr) IV Continuous <Continuous>  dextrose 50% Injectable 25 Gram(s) IV Push once  dextrose 50% Injectable 12.5 Gram(s) IV Push once  dextrose 50% Injectable 25 Gram(s) IV Push once  glucagon  Injectable 1 milliGRAM(s) IntraMuscular once  insulin lispro (ADMELOG) corrective regimen sliding scale   SubCutaneous three times a day before meals  insulin lispro (ADMELOG) corrective regimen sliding scale   SubCutaneous at bedtime  insulin lispro Injectable (ADMELOG) 4 Unit(s) SubCutaneous three times a day before meals    MEDICATIONS  (PRN):  dextrose Oral Gel 15 Gram(s) Oral once PRN Blood Glucose LESS THAN 70 milliGRAM(s)/deciliter      Allergies    seasonal allergies: sneezing (Other)  No Known Drug Allergies    Intolerances        REVIEW OF SYSTEMS  Constitutional: No fever  Eyes: No blurry vision  Neuro: No tremors  HEENT: No pain  Cardiovascular: No chest pain, palpitations  Respiratory: No SOB, no cough  GI: No nausea, vomiting, abdominal pain  : No dysuria  Skin: no rash  Psych: no depression  Endocrine: no polyuria, polydipsia  Hem/lymph: no swelling  Osteoporosis: no fractures  ALL OTHER SYSTEMS REVIEWED AND NEGATIVE     UNABLE TO OBTAIN     PHYSICAL EXAM   Vital Signs Last 24 Hrs  T(C): 36.7 (10 Feb 2024 09:48), Max: 37.1 (09 Feb 2024 20:57)  T(F): 98.1 (10 Feb 2024 09:48), Max: 98.7 (09 Feb 2024 20:57)  HR: 97 (10 Feb 2024 09:48) (78 - 118)  BP: 114/74 (10 Feb 2024 09:48) (114/74 - 172/115)  BP(mean): --  RR: 16 (10 Feb 2024 09:48) (16 - 19)  SpO2: 96% (10 Feb 2024 09:48) (95% - 100%)    Parameters below as of 10 Feb 2024 09:48  Patient On (Oxygen Delivery Method): room air      GENERAL: NAD, well-groomed, well-developed  EYES: No proptosis, no lid lag, anicteric  HEENT:  Atraumatic, Normocephalic, moist mucous membranes  THYROID: Normal size, no palpable nodules  RESPIRATORY: Clear to auscultation bilaterally; No rales, rhonchi, wheezing  CARDIOVASCULAR: Regular rate and rhythm; No murmurs  GI: Soft, nontender, non distended, normal bowel sounds  SKIN: Dry, intact  MUSCULOSKELETAL: Moving extremities spontaneously, no peripheral edema  NEURO: sensation intact, extraocular movements intact, no tremor  PSYCH: Answering questions appropriately, normal affect, normal mood  CUSHING'S SIGNS: no striae, no acanthosis, no dorsocervical fat pad    CAPILLARY BLOOD GLUCOSE      POCT Blood Glucose.: 353 mg/dL (10 Feb 2024 07:22)  POCT Blood Glucose.: 246 mg/dL (09 Feb 2024 22:03)  POCT Blood Glucose.: >600 mg/dL (09 Feb 2024 13:33)      A1C with Estimated Average Glucose Result: 11.0 % (02-09-24 @ 16:42)                            13.5   5.53  )-----------( 205      ( 09 Feb 2024 16:42 )             39.9       02-10    136  |  96<L>  |  12  ----------------------------<  328<H>  4.0   |  25  |  1.05    Ca    9.6      10 Feb 2024 06:10  Phos  3.9     02-09  Mg     2.40     02-09    TPro  8.3  /  Alb  4.8  /  TBili  0.4  /  DBili  x   /  AST  16  /  ALT  21  /  AlkPhos  101  02-09          LIPIDS    RADIOLOGY ENDOCRINE INITIAL CONSULT - t2dm    HPI:  Ms. Oconnor is a 53 year old female with no significant PMHx presenting with polyuria, polydipsia.    ENDOCRINE HISTORY   Diagnosed with DM: new diagnosis, may have been told she has prediabetes 6/23  Last HbA1c: 11  Endocrinologist: does not have  Home DM Meds: not on medication  Microvascular complications: denies retinopathy, nephropathy, neuropathy  Macrovascular complications: denies MI or CVA  SMBG: does not have  Symptoms: endorses polyuria, polydipsia, denies neuropathy  Diet at home:  - Breakfast: skips  - Lunch: skips  - Dinner: vegetables, salad, some rice, pasta  - Snacks: potato chips  - Endorses regular soda  Appetite in hospital: poor   Exercise: denies  PMHx: as above  PSHx: as above  Family hx: endorses mother with T2DM, denies family history of thyroid disease  Social hx: endorses alcohol use, denies tobacco use drug use  Insurance: [facility]  [facility]      PAST MEDICAL & SURGICAL HISTORY:  Uterine leiomyoma      History of ovarian cystectomy  2014      History of dilation and curettage  2015 2016      History of endometrial ablation  2016      H/O abdominal supracervical subtotal hysterectomy  b/l salpingectomy          FAMILY HISTORY:  Family history of diabetes mellitus (Mother)        Social History:      Home Medications:      MEDICATIONS  (STANDING):  dextrose 5%. 1000 milliLiter(s) (50 mL/Hr) IV Continuous <Continuous>  dextrose 5%. 1000 milliLiter(s) (100 mL/Hr) IV Continuous <Continuous>  dextrose 50% Injectable 25 Gram(s) IV Push once  dextrose 50% Injectable 12.5 Gram(s) IV Push once  dextrose 50% Injectable 25 Gram(s) IV Push once  glucagon  Injectable 1 milliGRAM(s) IntraMuscular once  insulin lispro (ADMELOG) corrective regimen sliding scale   SubCutaneous three times a day before meals  insulin lispro (ADMELOG) corrective regimen sliding scale   SubCutaneous at bedtime  insulin lispro Injectable (ADMELOG) 4 Unit(s) SubCutaneous three times a day before meals    MEDICATIONS  (PRN):  dextrose Oral Gel 15 Gram(s) Oral once PRN Blood Glucose LESS THAN 70 milliGRAM(s)/deciliter      Allergies    seasonal allergies: sneezing (Other)  No Known Drug Allergies    Intolerances        REVIEW OF SYSTEMS  Constitutional: No fever  Eyes: No blurry vision  Neuro: No tremors  HEENT: No pain  Cardiovascular: No chest pain, palpitations  Respiratory: No SOB, endorses cough  GI: No nausea, vomiting, abdominal pain  : No dysuria  Skin: no rash  Psych: no depression  Endocrine: endorses polyuria, polydipsia  Hem/lymph: no swelling  Osteoporosis: no fractures  ALL OTHER SYSTEMS REVIEWED AND NEGATIVE     PHYSICAL EXAM   Vital Signs Last 24 Hrs  T(C): 36.7 (10 Feb 2024 09:48), Max: 37.1 (09 Feb 2024 20:57)  T(F): 98.1 (10 Feb 2024 09:48), Max: 98.7 (09 Feb 2024 20:57)  HR: 97 (10 Feb 2024 09:48) (78 - 118)  BP: 114/74 (10 Feb 2024 09:48) (114/74 - 172/115)  BP(mean): --  RR: 16 (10 Feb 2024 09:48) (16 - 19)  SpO2: 96% (10 Feb 2024 09:48) (95% - 100%)    Parameters below as of 10 Feb 2024 09:48  Patient On (Oxygen Delivery Method): room air      GENERAL: NAD, well-groomed, well-developed  EYES: No proptosis, no lid lag, anicteric  HEENT:  Atraumatic, Normocephalic, moist mucous membranes  THYROID: Normal size, no palpable nodules  RESPIRATORY: Clear to auscultation bilaterally; No rales, rhonchi, wheezing  CARDIOVASCULAR: Regular rate and rhythm; No murmurs  GI: Soft, nontender, non distended, normal bowel sounds  SKIN: Dry, intact  MUSCULOSKELETAL: Moving extremities spontaneously, no peripheral edema  NEURO: sensation intact, extraocular movements intact, no tremor  PSYCH: Answering questions appropriately, normal affect, normal mood  CUSHING'S SIGNS: no striae, no acanthosis, no dorsocervical fat pad    CAPILLARY BLOOD GLUCOSE      POCT Blood Glucose.: 353 mg/dL (10 Feb 2024 07:22)  POCT Blood Glucose.: 246 mg/dL (09 Feb 2024 22:03)  POCT Blood Glucose.: >600 mg/dL (09 Feb 2024 13:33)      A1C with Estimated Average Glucose Result: 11.0 % (02-09-24 @ 16:42)                            13.5   5.53  )-----------( 205      ( 09 Feb 2024 16:42 )             39.9       02-10    136  |  96<L>  |  12  ----------------------------<  328<H>  4.0   |  25  |  1.05    Ca    9.6      10 Feb 2024 06:10  Phos  3.9     02-09  Mg     2.40     02-09    TPro  8.3  /  Alb  4.8  /  TBili  0.4  /  DBili  x   /  AST  16  /  ALT  21  /  AlkPhos  101  02-09          LIPIDS    RADIOLOGY

## 2024-02-10 NOTE — ED CDU PROVIDER DISPOSITION NOTE - PATIENT PORTAL LINK FT
You can access the FollowMyHealth Patient Portal offered by [facility] by registering at the following website: [email or web address]. By joining [facility]’s FollowMyHealth portal, you will also be able to view your health information using other applications (apps) compatible with our system.

## 2024-02-10 NOTE — ED CDU PROVIDER DISPOSITION NOTE - NSFOLLOWUPINSTRUCTIONS_ED_ALL_ED_FT
Follow up with your PMD within 48-72 hrs or our clinic [phone].   Follow up with Endocrinology within the week.    Call [phone] for appt.    Show copies of your reports given to you.     Start Lantus 17 units inject subcutaneously at bedtime.   Start Metformin 500mg twice a day for one week and increase to 1000mg twice a day thereafter.       Monitor you finger sticks 4x/day- before meals and at bedtime and record in a log book.    Bring that log book with you when you follow up with Endocrinology.    Blood sugar less than 60 or greater than 300 you should return to the ED.    Weakness, chest pain, nausea, vomiting, fever, chills, abdominal pain or ANY NEW CONCERNING SYMPTOMS return to the Emergency Department.     Future considerations:  GLP1RA once weekly injection can be added as outpatient.

## 2024-02-10 NOTE — CONSULT NOTE ADULT - ASSESSMENT
Ms. Oconnor is a 53 year old female with no significant PMHx presenting with polyuria, polydipsia. Endocrinology consulted for management of T2DM.    Poorly controlled T2DM with hyperglycemia  - HbA1c: 11  - Home Regimen:   - Endocrinologist:  PLAN  - Hold oral DM agents while inpatient  - Start Lantus  units at bedtime. DO NOT HOLD IF NPO.  - Start Admelog  units TID pre-meal. HOLD IF NPO.  - Use moderate/Use low dose Admelog correction scale pre-meal  - Use moderate/Use low dose Admelog correction scale at bedtime  - Fingerstick BG before meals and bedtime  - Goal -180  - Carbohydrate consistent diet  - RD consult  Discharge plan:  - Discharge medications: ************************  - Patient to call doctor with persistent high or low BG at home.   - Ensure patient has glucometer, test strips and lancets on discharge.  - Recommend routine outpatient ophthalmology, podiatry and endocrinology f/u    HTN  - Home regimen: not on medication per chart review  PLAN  - Can check urine microalbumin outpatient  - Outpatient goal BP <130/80. Management per primary team.    HLD  - Home regimen: not on statin per chart review  PLAN  - Would likely benefit from a statin if no contraindication  - Can check lipid profile if not done recently    Discussed with primary team.    Eduar Wilson MD, Endocrinology Fellow  Pager [phone] from 9am to 5pm. After hours and on weekends, please call [phone].   Ms. Oconnor is a 53 year old female with no significant PMHx presenting with polyuria, polydipsia. Endocrinology consulted for management of T2DM.    Poorly controlled T2DM with hyperglycemia  - HbA1c: 11  - Home Regimen: not on medication  - Endocrinologist: does not have  PLAN  - Start Lantus  units at bedtime. DO NOT HOLD IF NPO.  - Start Admelog  units TID pre-meal. HOLD IF NPO.  - Use moderate/Use low dose Admelog correction scale pre-meal  - Use moderate/Use low dose Admelog correction scale at bedtime  - Fingerstick BG before meals and bedtime  - Goal -180  - Carbohydrate consistent diet  - RD consult  Discharge plan:  - Discharge medications: ************************  - Patient to call doctor with persistent high or low BG at home.   - Ensure patient has glucometer, test strips and lancets on discharge.  - Recommend routine outpatient ophthalmology, podiatry and endocrinology f/u    HTN  - Home regimen: not on medication per chart review  PLAN  - Can check urine microalbumin outpatient  - Outpatient goal BP <130/80. Management per primary team.    HLD  - Home regimen: not on statin per chart review  PLAN  - Would likely benefit from a statin if no contraindication  - Can check lipid profile if not done recently    Patient can call to make an appointment.  [address] [place] [address]  Phone Number: [phone]    Discussed with primary team.    Eduar Wilson MD, Endocrinology Fellow  Pager [phone] from 9am to 5pm. After hours and on weekends, please call [phone].   Ms. Oconnor is a 53 year old female with no significant PMHx presenting with polyuria, polydipsia. Endocrinology consulted for management of T2DM.    Poorly controlled T2DM with hyperglycemia  - HbA1c: 11  - Home Regimen: not on medication  - Endocrinologist: does not have  PLAN  - Start Lantus 17 units at bedtime. DO NOT HOLD IF NPO.  - Start Admelog 7 units TID pre-meal. HOLD IF NPO.  - Use low dose Admelog correction scale pre-meal  - Use low dose Admelog correction scale at bedtime  - hypoglycemia protocol prn   - Fingerstick BG before meals and bedtime  - Goal -180  - Carbohydrate consistent diet  - RD consult  Discharge plan:  - Discharge medications: Once daily basal insulin 17 units (lantus, basaglar, semglee), metformin 500mg bid for one week and increase to 1000mg bid thereafter. Consider GLP-1 agonist outpatient.  - Patient to call doctor with persistent high or low BG at home.   - Ensure patient has glucometer, test strips and lancets on discharge.  - Recommend routine outpatient ophthalmology, podiatry and endocrinology f/u    HTN  - Home regimen: not on medication per chart review  PLAN  - Can check urine microalbumin outpatient  - Outpatient goal BP <130/80. Management per primary team.    HLD  - Home regimen: not on statin per chart review  PLAN  - Would likely benefit from a statin if no contraindication  - Can check lipid profile if not done recently    Patient can call to make an appointment.  [address] [place] [address]  Phone Number: [phone]    Discussed with primary team.    Eduar Wilson MD, Endocrinology Fellow  Pager [phone] from 9am to 5pm. After hours and on weekends, please call [phone].

## 2024-02-10 NOTE — ED CDU PROVIDER SUBSEQUENT DAY NOTE - ATTENDING APP SHARED VISIT CONTRIBUTION OF CARE
I performed a face-to-face evaluation of the patient and performed a history and physical examination. I agree with the history and physical examination. If this was a PA visit, I personally saw the patient with the PA and performed a substantive portion of the visit including all aspects of the medical decision making.    New DM (A1c ~11). Mild DKA (improved). Start DM meds. Awaiting Endo consult.

## 2024-02-10 NOTE — CONSULT NOTE ADULT - ATTENDING COMMENTS
53 y.o. female with newly diagnosed uncontrolled type 2 DM presents with hyperglycemia.    1. Type 2 DM- Poor control with Hba1c 11%. Will start basal bolus regimen while in hospital and plan for Metformin and basal insulin as outpatient. Would benefit from GLP-1 RA. Encouraged a carbohydrate consistent diet. Will need glucometer teach and insulin pen teach before discharge.    2. Will benefit from outpatient endocrinology follow up along with RD/CDE visit.      Josue Chapman DO  Attending Division of Endocrinology  [phone]

## 2024-02-10 NOTE — ED CDU PROVIDER DISPOSITION NOTE - ATTENDING CONTRIBUTION TO CARE
I performed a face-to-face evaluation of the patient and performed a history and physical examination. I agree with the history and physical examination. If this was a PA visit, I personally saw the patient with the PA and performed a substantive portion of the visit including all aspects of the medical decision making.    New DM (A1c ~11). Mild DKA (improved). Endo consult: started DM meds. Dc home.

## 2024-02-10 NOTE — ED CDU PROVIDER DISPOSITION NOTE - CLINICAL COURSE
54 yo F with new onset DM A1C of 11.0 , FS >600, initial gap 16 > 14. glucose 400 after fluids and 5 U admelog. No acidosis, BHB 1.1. will start patient on weight based insulin, LDSS.  Endo recs appreciated. stable for dc home. all meds sent to pharmacy. outpatient follow up. strict return precautions.

## 2024-02-11 LAB
CULTURE RESULTS: SIGNIFICANT CHANGE UP
SPECIMEN SOURCE: SIGNIFICANT CHANGE UP

## 2024-02-28 ENCOUNTER — TRANSCRIPTION ENCOUNTER (OUTPATIENT)
Age: 54
End: 2024-02-28

## 2024-03-13 ENCOUNTER — APPOINTMENT (OUTPATIENT)
Dept: ENDOCRINOLOGY | Facility: CLINIC | Age: 54
End: 2024-03-13
Payer: COMMERCIAL

## 2024-03-13 VITALS
DIASTOLIC BLOOD PRESSURE: 84 MMHG | HEIGHT: 63 IN | OXYGEN SATURATION: 96 % | SYSTOLIC BLOOD PRESSURE: 130 MMHG | BODY MASS INDEX: 27.11 KG/M2 | HEART RATE: 113 BPM | WEIGHT: 153 LBS

## 2024-03-13 VITALS — HEART RATE: 99 BPM

## 2024-03-13 DIAGNOSIS — Z83.3 FAMILY HISTORY OF DIABETES MELLITUS: ICD-10-CM

## 2024-03-13 LAB
25(OH)D3 SERPL-MCNC: 64.1 NG/ML
ALBUMIN SERPL ELPH-MCNC: 5 G/DL
ALP BLD-CCNC: 70 U/L
ALT SERPL-CCNC: 37 U/L
ANION GAP SERPL CALC-SCNC: 12 MMOL/L
AST SERPL-CCNC: 27 U/L
BILIRUB SERPL-MCNC: 0.2 MG/DL
BUN SERPL-MCNC: 13 MG/DL
C PEPTIDE SERPL-MCNC: 1.5 NG/ML
CALCIUM SERPL-MCNC: 10.4 MG/DL
CHLORIDE SERPL-SCNC: 104 MMOL/L
CHOLEST SERPL-MCNC: 228 MG/DL
CO2 SERPL-SCNC: 27 MMOL/L
CREAT SERPL-MCNC: 1.1 MG/DL
CREAT SPEC-SCNC: 110 MG/DL
EGFR: 60 ML/MIN/1.73M2
GLUCOSE SERPL-MCNC: 59 MG/DL
HDLC SERPL-MCNC: 52 MG/DL
LDLC SERPL CALC-MCNC: 116 MG/DL
MICROALBUMIN 24H UR DL<=1MG/L-MCNC: <1.2 MG/DL
MICROALBUMIN/CREAT 24H UR-RTO: NORMAL MG/G
NONHDLC SERPL-MCNC: 176 MG/DL
POTASSIUM SERPL-SCNC: 4.4 MMOL/L
PROT SERPL-MCNC: 7.7 G/DL
SODIUM SERPL-SCNC: 142 MMOL/L
TRIGL SERPL-MCNC: 344 MG/DL
TSH SERPL-ACNC: 1.19 UIU/ML
VIT B12 SERPL-MCNC: 571 PG/ML

## 2024-03-13 PROCEDURE — 99204 OFFICE O/P NEW MOD 45 MIN: CPT

## 2024-03-13 RX ORDER — LANCETS 30 GAUGE
EACH MISCELLANEOUS
Qty: 360 | Refills: 2 | Status: ACTIVE | COMMUNITY
Start: 2024-03-13 | End: 1900-01-01

## 2024-03-13 RX ORDER — METFORMIN ER 500 MG 500 MG/1
500 TABLET ORAL
Qty: 120 | Refills: 3 | Status: ACTIVE | COMMUNITY
Start: 2024-03-13 | End: 1900-01-01

## 2024-03-13 RX ORDER — BLOOD SUGAR DIAGNOSTIC
STRIP MISCELLANEOUS 4 TIMES DAILY
Qty: 360 | Refills: 3 | Status: ACTIVE | COMMUNITY
Start: 2024-03-13 | End: 1900-01-01

## 2024-03-13 RX ORDER — ATORVASTATIN CALCIUM 10 MG/1
10 TABLET, FILM COATED ORAL
Qty: 30 | Refills: 3 | Status: ACTIVE | COMMUNITY
Start: 2024-03-13 | End: 1900-01-01

## 2024-03-13 NOTE — HISTORY OF PRESENT ILLNESS
[FreeTextEntry1] : The patient is a 53 year old female being seen in the office today for evaluation of diabetes. Denies other PMH. Recently went to the hospital with polyuria and polydipsia. HgA1c was found to be 11%, endocrinology was consulted for management of T2DM. Prior to hospital visit, patient was not on any DM medications.   Type of Diabetes: T2DM  Duration of Diabetes: new diagnosis, may have been told she has prediabetes 6/23 A1c: 11% 2/9/2024  Current home regimen: - Lantus 17 units at bedtime - Metformin 1000mg BID Past medications: none Compliance: reports compliancy with medications, reports tolerating medications well, occasional upset stomach with Metformin, improving  Insulin injection sites: abdomen, alternates sites  Diabetes complications: Microvascular: [-] neuropathy, [-] nephropathy, [-] retinopathy Macrovascular: [-] CAD, [-] CVA, [-] PAD No DKA hx  Last retinopathy screening: last seen by optho one week ago, denies hx of retinopathy  Denies seeing podiatry, denies active issues with feet   Was previously wearing a vic, does not have more refills, has not been wearing a vic for over 2 weeks BG self monitoring: Checks fingersticks 2-3 times a day, brought in log book  BG Trends: - Before Breakfast: 91, 88, 111, 104, 104, 99, 92, 166, 91 - Before meals: 125, 141, 163, 101, 110, 136, 122, 139, 151, 119, 91, 112, 169, 92 - Before Bedtime: 104, 107, 96, 127, 135, 106, 106   2/23/2024 had two low BG readings since discharge from the hospital of 64 and 55 on vic overnight and early morning, confirmed hypoglycemia with glucometer. Corrected with glucose tablets.   Diet at home: - Breakfast: before was skipping, will sometimes have granola bar with tea and milk and sugar. Since discharge from the hospital has been having half grapefruit for breakfast Snack: raw almonds - Lunch: 2 strawberries and 4 baby carrots and 4 sugar snap peas with low fat ranch dressing made with greek yogurt 3pm: smart pop popcorn - Dinner: brown rice with vegetables - Snacks: occasional potato chips - No longer drinking regular soda, has sugar free drinks or flavored water  - States she is unsure of what she can eat given dx of DM, would like more education on DM diet   Exercise: has not started yet, plans to start using elliptical soon  Steroid intake: denies  Family hx: endorses mother with T2DM, aunts and uncles with DM, denies family history of thyroid disease Social hx: endorses social alcohol use, denies current tobacco use or recreational drug use Social history: currently working, working at [employer], lives with    Denies current blurry vision, polydypsia, or polyuria  PERTINENT LABS:   A1C with Estimated Average Glucose Result: 11.0 % (02-09-24 @ 16:42)                13.5 5.53  )-----------( 205      ( 09 Feb 2024 16:42 )            39.9     02-10   136  |  96<L>  |  12 ----------------------------<  328<H> 4.0   |  25  |  1.05   Ca    9.6      10 Feb 2024 06:10 Phos  3.9     02-09 Mg     2.40     02-09   TPro  8.3  /  Alb  4.8  /  TBili  0.4  /  DBili  x   /  AST  16  /  ALT  21  / AlkPhos  101  02-09   PCP: Dr. Salvador Montoya  [address] [place] [address] [phone]

## 2024-03-13 NOTE — REVIEW OF SYSTEMS
[TextEntry] : REVIEW OF SYSTEMS Constitutional: No fever Eyes: No blurry vision Neuro: No tremors HEENT: No pain Cardiovascular: No chest pain, palpitations Respiratory: No SOB, endorses cough GI: No nausea, vomiting, abdominal pain : No dysuria Skin: no rash Psych: no depression Endocrine: endorses polyuria, polydipsia Hem/lymph: no swelling ALL OTHER SYSTEMS REVIEWED AND NEGATIVE

## 2024-03-13 NOTE — PHYSICAL EXAM
[Alert] : alert [Well Developed] : well developed [No Acute Distress] : no acute distress [Normal Sclera/Conjunctiva] : normal sclera/conjunctiva [No Proptosis] : no proptosis [Supple] : the neck was supple Home [No Respiratory Distress] : no respiratory distress [Thyroid Not Enlarged] : the thyroid was not enlarged [No Accessory Muscle Use] : no accessory muscle use [Normal Rate and Effort] : normal respiratory rate and effort [Clear to Auscultation] : lungs were clear to auscultation bilaterally [Normal S1, S2] : normal S1 and S2 [Normal Rate] : heart rate was normal [Regular Rhythm] : with a regular rhythm [No Edema] : no peripheral edema [Not Tender] : non-tender [Soft] : abdomen soft [Not Distended] : not distended [Spine Straight] : spine straight [Normal Gait] : normal gait [No Involuntary Movements] : no involuntary movements were seen [Left foot was examined, including] : left foot ~C was examined, including visual inspection with sensory and pulse exams [Right foot was examined, including] : right foot ~C was examined, including visual inspection with sensory and pulse exams [Diminished Throughout Both Feet] : normal tactile sensation with monofilament testing throughout both feet [No Tremors] : no tremors [Normal Sensation on Monofilament Testing] : normal sensation on monofilament testing of lower extremities [Oriented x3] : oriented to person, place, and time [de-identified] : foot exam performed [id number] office visit

## 2024-03-13 NOTE — ASSESSMENT
[FreeTextEntry1] : The patient is a 53 year old female being seen in the office today for evaluation of diabetes. Patient with PMH of htn and hld. Recently went to the hospital with polyuria and polydipsia. HgA1c was found to be 11%, endocrinology was consulted for management of T2DM. Prior to hospital visit, patient was not on any DM medications.   T2DM - A1c 11% 2/2024 - Current regimen: Lantus 17 units at bedtime Metformin 1000mg BID - Plan:  Reduce Lantus to 14 units Continue Metformin 1000mg BID Start Ozempic 0.25mg once a week for 4 weeks, she would prefer to be seen in office in 1 month before further dose increase of Ozempic  States she is unsure of what she can eat given new DM dx. Often skips meals, discussed importance of carbohydrate consistent diet, monitor appetite after starting GLP-1, Recommend CDE evaluation  Call office with Highs/Lows in BG - BG monitoring: will attempt to get CGM for patient, for now check BG with glucometer before meals and at bedtime, occasional 2 hour post meal BG check.  - Labs: will order CMP, lipid profile, Vitamin B12, C-peptide, urine ACR  Retinopathy screening: UTD denies retinopathy   Foot exam/podiatrist: foot exam 3/13/2024 wnl  - Counseling: We discussed diabetes foot care, long term complications of diabetes including but not limited to neuropathy, nephropathy, retinopathy and cardiovascular disease and the benefits of good glycemic control in preventing said complications. We discussed the risks and benefits of diabetes medications and/or insulin as relevant for today, prevention and management of hypoglycemia, importance of medication compliance and blood glucose monitoring. - Discussed diabetic diet, decreased intake of simple/processed sugars, increase intake of whole foods with protein and fiber. Increase physical activity as tolerated with cardiovascular exercise 150 min/per week consistently and resistance exercise three days per week. - I discussed the importance of avoiding mild hypoglycemia (FS<70 mg/dl) and severe hypoglycemia (FS<55 mg/dl) with the patient. I also discussed hypoglycemia correction with 4 oz of juice or 4 glucose tablets and rechecking FS in 15 minutes. If FS is still low, repeat 4oz juice or 4 glucose tablets and consume 12 grams of carbohydrates.  Tachycardia - 113 documented by MA - 99 on palpation, asymptomatic - will check TSH - Follow up with PCP  Follow up with me in 1 month to discuss possible dose titration CDE evaluation Has appointment with Dr. Guerrier in 3 months

## 2024-03-20 RX ORDER — BLOOD-GLUCOSE SENSOR
EACH MISCELLANEOUS
Qty: 2 | Refills: 2 | Status: ACTIVE | COMMUNITY
Start: 2024-03-20 | End: 1900-01-01

## 2024-04-15 ENCOUNTER — APPOINTMENT (OUTPATIENT)
Dept: ENDOCRINOLOGY | Facility: CLINIC | Age: 54
End: 2024-04-15
Payer: COMMERCIAL

## 2024-04-15 VITALS — WEIGHT: 151 LBS | HEIGHT: 63 IN | BODY MASS INDEX: 26.75 KG/M2

## 2024-04-15 PROCEDURE — G0108 DIAB MANAGE TRN  PER INDIV: CPT

## 2024-04-15 PROCEDURE — 95251 CONT GLUC MNTR ANALYSIS I&R: CPT

## 2024-06-03 ENCOUNTER — RX RENEWAL (OUTPATIENT)
Age: 54
End: 2024-06-03

## 2024-06-27 ENCOUNTER — APPOINTMENT (OUTPATIENT)
Dept: ENDOCRINOLOGY | Facility: CLINIC | Age: 54
End: 2024-06-27
Payer: COMMERCIAL

## 2024-06-27 VITALS
HEART RATE: 111 BPM | DIASTOLIC BLOOD PRESSURE: 88 MMHG | WEIGHT: 136 LBS | BODY MASS INDEX: 24.09 KG/M2 | OXYGEN SATURATION: 98 % | SYSTOLIC BLOOD PRESSURE: 132 MMHG

## 2024-06-27 DIAGNOSIS — E11.9 TYPE 2 DIABETES MELLITUS W/OUT COMPLICATIONS: ICD-10-CM

## 2024-06-27 DIAGNOSIS — E78.5 HYPERLIPIDEMIA, UNSPECIFIED: ICD-10-CM

## 2024-06-27 PROCEDURE — 83036 HEMOGLOBIN GLYCOSYLATED A1C: CPT | Mod: QW

## 2024-06-27 PROCEDURE — 99214 OFFICE O/P EST MOD 30 MIN: CPT

## 2024-06-27 PROCEDURE — 95251 CONT GLUC MNTR ANALYSIS I&R: CPT

## 2024-06-27 PROCEDURE — G2211 COMPLEX E/M VISIT ADD ON: CPT | Mod: NC

## 2024-06-28 LAB — HBA1C MFR BLD HPLC: 4.7

## 2024-07-01 ENCOUNTER — TRANSCRIPTION ENCOUNTER (OUTPATIENT)
Age: 54
End: 2024-07-01

## 2024-07-01 ENCOUNTER — RX RENEWAL (OUTPATIENT)
Age: 54
End: 2024-07-01

## 2024-07-08 ENCOUNTER — RX RENEWAL (OUTPATIENT)
Age: 54
End: 2024-07-08

## 2024-08-26 ENCOUNTER — RX RENEWAL (OUTPATIENT)
Age: 54
End: 2024-08-26

## 2024-09-09 NOTE — ED CDU PROVIDER INITIAL DAY NOTE - ATTENDING APP SHARED VISIT CONTRIBUTION OF CARE
[FreeTextEntry3] : pink gritty papules x5 on scalp thin scaly papule on L superior helical rim Dr. Rajan: 52 yo female with no sig PMH, PSH of hysterectomy, in ED sent by PCP due to hyperglycemia.  Pt notes that for a few weeks she has had increased thirst and she is urinating "every 2 minutes", and for 2 days she has had blurry vision that makes her glasses seem like they are not working.  No fever, N/V/D, abdominal pain or other complaints.  Pt not on steroids or other new medications.  She spoke to her PCP who placed her on a glucose monitor, and today she was told to come to the ED because the glucose monitor was reading in the 700s.  Pt's mother has DM but was not diagnosed until close to her 80s.  On exam pt well appearing, in NAD, heart RRR, lungs CTAB, abd NTND, extremities without swelling, strength 5/5 in all extremities and skin without rash.  Found to have new diagnosis of DM and placed in CDU for endocrinology assistance to begin treatment for uncontrolled DM.

## 2024-09-11 ENCOUNTER — TRANSCRIPTION ENCOUNTER (OUTPATIENT)
Age: 54
End: 2024-09-11

## 2024-11-15 ENCOUNTER — RX RENEWAL (OUTPATIENT)
Age: 54
End: 2024-11-15

## 2024-12-30 ENCOUNTER — APPOINTMENT (OUTPATIENT)
Dept: ENDOCRINOLOGY | Facility: CLINIC | Age: 54
End: 2024-12-30
Payer: COMMERCIAL

## 2024-12-30 ENCOUNTER — APPOINTMENT (OUTPATIENT)
Dept: ENDOCRINOLOGY | Facility: CLINIC | Age: 54
End: 2024-12-30

## 2024-12-30 DIAGNOSIS — E11.9 TYPE 2 DIABETES MELLITUS W/OUT COMPLICATIONS: ICD-10-CM

## 2024-12-30 PROCEDURE — 95251 CONT GLUC MNTR ANALYSIS I&R: CPT

## 2024-12-30 PROCEDURE — G0108 DIAB MANAGE TRN  PER INDIV: CPT

## 2025-01-13 ENCOUNTER — RX RENEWAL (OUTPATIENT)
Age: 55
End: 2025-01-13

## 2025-01-13 NOTE — ED PROVIDER NOTE - CPE EDP CARDIAC NORM
Quality 130: Documentation Of Current Medications In The Medical Record: Current Medications Documented Detail Level: Detailed Quality 431: Preventive Care And Screening: Unhealthy Alcohol Use - Screening: Patient not identified as an unhealthy alcohol user when screened for unhealthy alcohol use using a systematic screening method Quality 226: Preventive Care And Screening: Tobacco Use: Screening And Cessation Intervention: Patient screened for tobacco use and is an ex/non-smoker normal...

## 2025-05-29 NOTE — DISCHARGE NOTE ADULT - NS AS DC FOLLOWUP INST YN
Plan: RTC in 2 weeks for SRM and follow up for results of BXs. Detail Level: Zone Render In Strict Bullet Format?: No Initiate Treatment: clobetasol 0.05 % topical cream BID\\nApply twice daily to rash up to 2 weeks/month as needed. Yes